# Patient Record
Sex: FEMALE | Race: WHITE | NOT HISPANIC OR LATINO | URBAN - METROPOLITAN AREA
[De-identification: names, ages, dates, MRNs, and addresses within clinical notes are randomized per-mention and may not be internally consistent; named-entity substitution may affect disease eponyms.]

---

## 2014-12-15 LAB — CRC RECOMMENDATION EXT: NORMAL

## 2023-04-19 LAB — BCS RECOMMENDATION EXT: NORMAL

## 2023-04-21 ENCOUNTER — DOCUMENTATION ONLY (OUTPATIENT)
Dept: FAMILY MEDICINE | Facility: CLINIC | Age: 73
End: 2023-04-21

## 2023-04-26 LAB
CHOLEST SERPL-MSCNC: 166 MG/DL (ref 0–200)
HDLC SERPL-MCNC: 64 MG/DL (ref 35–70)
LDLC SERPL CALC-MCNC: 79 MG/DL (ref 0–160)
TRIGL SERPL-MCNC: 116 MG/DL (ref 40–160)

## 2023-04-27 ENCOUNTER — DOCUMENTATION ONLY (OUTPATIENT)
Dept: FAMILY MEDICINE | Facility: CLINIC | Age: 73
End: 2023-04-27
Payer: MEDICARE

## 2023-04-28 ENCOUNTER — DOCUMENTATION ONLY (OUTPATIENT)
Dept: FAMILY MEDICINE | Facility: CLINIC | Age: 73
End: 2023-04-28
Payer: MEDICARE

## 2023-04-29 PROBLEM — E78.2 MIXED HYPERLIPIDEMIA: Status: ACTIVE | Noted: 2023-04-29

## 2023-04-29 PROBLEM — F41.9 ANXIETY AND DEPRESSION: Status: ACTIVE | Noted: 2023-04-29

## 2023-04-29 PROBLEM — N39.46 MIXED STRESS AND URGE URINARY INCONTINENCE: Status: ACTIVE | Noted: 2023-04-29

## 2023-04-29 PROBLEM — M85.80 OSTEOPENIA: Status: ACTIVE | Noted: 2023-04-29

## 2023-04-29 PROBLEM — F32.A ANXIETY AND DEPRESSION: Status: ACTIVE | Noted: 2023-04-29

## 2023-05-11 ENCOUNTER — PATIENT OUTREACH (OUTPATIENT)
Dept: ADMINISTRATIVE | Facility: HOSPITAL | Age: 73
End: 2023-05-11
Payer: MEDICARE

## 2023-05-11 ENCOUNTER — OFFICE VISIT (OUTPATIENT)
Dept: FAMILY MEDICINE | Facility: CLINIC | Age: 73
End: 2023-05-11
Payer: MEDICARE

## 2023-05-11 VITALS
TEMPERATURE: 99 F | SYSTOLIC BLOOD PRESSURE: 121 MMHG | RESPIRATION RATE: 18 BRPM | BODY MASS INDEX: 28.7 KG/M2 | OXYGEN SATURATION: 97 % | HEIGHT: 63 IN | DIASTOLIC BLOOD PRESSURE: 77 MMHG | HEART RATE: 89 BPM | WEIGHT: 162 LBS

## 2023-05-11 DIAGNOSIS — Z00.00 WELLNESS EXAMINATION: Primary | ICD-10-CM

## 2023-05-11 DIAGNOSIS — E78.2 MIXED HYPERLIPIDEMIA: ICD-10-CM

## 2023-05-11 DIAGNOSIS — N39.46 MIXED STRESS AND URGE URINARY INCONTINENCE: ICD-10-CM

## 2023-05-11 DIAGNOSIS — F32.A ANXIETY AND DEPRESSION: ICD-10-CM

## 2023-05-11 DIAGNOSIS — F41.9 ANXIETY AND DEPRESSION: ICD-10-CM

## 2023-05-11 DIAGNOSIS — M85.89 OSTEOPENIA OF MULTIPLE SITES: ICD-10-CM

## 2023-05-11 PROCEDURE — 1126F PR PAIN SEVERITY QUANTIFIED, NO PAIN PRESENT: ICD-10-PCS | Mod: CPTII,,, | Performed by: FAMILY MEDICINE

## 2023-05-11 PROCEDURE — 1100F PTFALLS ASSESS-DOCD GE2>/YR: CPT | Mod: CPTII,,, | Performed by: FAMILY MEDICINE

## 2023-05-11 PROCEDURE — 1160F PR REVIEW ALL MEDS BY PRESCRIBER/CLIN PHARMACIST DOCUMENTED: ICD-10-PCS | Mod: CPTII,,, | Performed by: FAMILY MEDICINE

## 2023-05-11 PROCEDURE — 1100F PR PT FALLS ASSESS DOC 2+ FALLS/FALL W/INJURY/YR: ICD-10-PCS | Mod: CPTII,,, | Performed by: FAMILY MEDICINE

## 2023-05-11 PROCEDURE — 3288F FALL RISK ASSESSMENT DOCD: CPT | Mod: CPTII,,, | Performed by: FAMILY MEDICINE

## 2023-05-11 PROCEDURE — 1159F PR MEDICATION LIST DOCUMENTED IN MEDICAL RECORD: ICD-10-PCS | Mod: CPTII,,, | Performed by: FAMILY MEDICINE

## 2023-05-11 PROCEDURE — 3078F DIAST BP <80 MM HG: CPT | Mod: CPTII,,, | Performed by: FAMILY MEDICINE

## 2023-05-11 PROCEDURE — 3074F SYST BP LT 130 MM HG: CPT | Mod: CPTII,,, | Performed by: FAMILY MEDICINE

## 2023-05-11 PROCEDURE — 1160F RVW MEDS BY RX/DR IN RCRD: CPT | Mod: CPTII,,, | Performed by: FAMILY MEDICINE

## 2023-05-11 PROCEDURE — 3008F BODY MASS INDEX DOCD: CPT | Mod: CPTII,,, | Performed by: FAMILY MEDICINE

## 2023-05-11 PROCEDURE — G0439 PR MEDICARE ANNUAL WELLNESS SUBSEQUENT VISIT: ICD-10-PCS | Mod: ,,, | Performed by: FAMILY MEDICINE

## 2023-05-11 PROCEDURE — 3008F PR BODY MASS INDEX (BMI) DOCUMENTED: ICD-10-PCS | Mod: CPTII,,, | Performed by: FAMILY MEDICINE

## 2023-05-11 PROCEDURE — 3078F PR MOST RECENT DIASTOLIC BLOOD PRESSURE < 80 MM HG: ICD-10-PCS | Mod: CPTII,,, | Performed by: FAMILY MEDICINE

## 2023-05-11 PROCEDURE — 1126F AMNT PAIN NOTED NONE PRSNT: CPT | Mod: CPTII,,, | Performed by: FAMILY MEDICINE

## 2023-05-11 PROCEDURE — 3074F PR MOST RECENT SYSTOLIC BLOOD PRESSURE < 130 MM HG: ICD-10-PCS | Mod: CPTII,,, | Performed by: FAMILY MEDICINE

## 2023-05-11 PROCEDURE — G0439 PPPS, SUBSEQ VISIT: HCPCS | Mod: ,,, | Performed by: FAMILY MEDICINE

## 2023-05-11 PROCEDURE — 3288F PR FALLS RISK ASSESSMENT DOCUMENTED: ICD-10-PCS | Mod: CPTII,,, | Performed by: FAMILY MEDICINE

## 2023-05-11 PROCEDURE — 1159F MED LIST DOCD IN RCRD: CPT | Mod: CPTII,,, | Performed by: FAMILY MEDICINE

## 2023-05-11 RX ORDER — GLUCOSAM/CHONDRO/HERB 149/HYAL 750-100 MG
TABLET ORAL
COMMUNITY
Start: 2022-12-06

## 2023-05-11 RX ORDER — AMPICILLIN TRIHYDRATE 500 MG
1000 CAPSULE ORAL
COMMUNITY
Start: 2022-12-06 | End: 2023-05-11 | Stop reason: SDUPTHER

## 2023-05-11 RX ORDER — AMPICILLIN TRIHYDRATE 500 MG
1000 CAPSULE ORAL DAILY
Qty: 90 CAPSULE | Refills: 3 | Status: SHIPPED | OUTPATIENT
Start: 2023-05-11 | End: 2024-05-10

## 2023-05-11 RX ORDER — ASPIRIN 81 MG/1
81 TABLET ORAL
COMMUNITY
Start: 2022-12-06

## 2023-05-11 RX ORDER — DICLOFENAC SODIUM 1 MG/ML
1 SOLUTION/ DROPS OPHTHALMIC 4 TIMES DAILY
COMMUNITY
Start: 2022-11-23 | End: 2023-11-27

## 2023-05-11 RX ORDER — ROSUVASTATIN CALCIUM 40 MG/1
40 TABLET, COATED ORAL
COMMUNITY
Start: 2023-04-06

## 2023-05-11 RX ORDER — SERTRALINE HYDROCHLORIDE 50 MG/1
50 TABLET, FILM COATED ORAL
COMMUNITY
Start: 2023-04-20 | End: 2023-07-31

## 2023-05-11 RX ORDER — LATANOPROST 50 UG/ML
1 SOLUTION/ DROPS OPHTHALMIC NIGHTLY
COMMUNITY
Start: 2022-12-23

## 2023-05-11 NOTE — PROGRESS NOTES
Patient ID: [id number]     Chief Complaint: Wellness      HPI:     Katerina Alfaro is a 72 y.o. female here today for a Medicare Wellness.   1) Hyperlipidemia: Patient is taking medication at Night. Trying to follow modify diet. Increase activity. No side effects of medication noted.  2)  Depression/Anxiety: Patient has been doing well on medication. Trying to incorporate lifestyle changes including exercise.  No noticeable side effects of the medication.  3) Hypertension: Patient has been taking medicine daily. BP is normal at home. No symptoms associated with increased BP such as headache/ visual changes/ dizziness/ chest pain.     Cervical Cancer Screening  : Patient refused pelvic exam    Breast Cancer Screening - Mammogram 4/19/2023    Colon Cancer Screening - Colonoscopy completed by Dr. Gannon    Osteoporosis Screening -DEXA Orders    Eye Exam - Recommend annually.    Dental Exam - Recommend biannually  Vaccinations -   Immunization History   Administered Date(s) Administered    COVID-19, MRNA, LN-S, PF (Pfizer) (Gray Cap) 06/21/2022    COVID-19, MRNA, LN-S, PF (Pfizer) (Purple Cap) 02/02/2021, 02/23/2021, 11/24/2021    Influenza (FLUAD) - Quadrivalent - Adjuvanted - PF *Preferred* (65+) 10/17/2022    Influenza - High Dose - PF (65 years and older) 11/15/2016, 01/04/2018, 10/19/2018    Influenza - Quadrivalent - PF *Preferred* (6 months and older) 11/03/2010, 09/24/2011, 07/26/2013, 10/27/2015, 02/14/2019    Influenza A (H1N1) 2009 Monovalent - IM - PF 12/10/2009    Pneumococcal Conjugate - 13 Valent 11/15/2016    Pneumococcal Polysaccharide - 23 Valent 03/07/2018    Tdap 09/09/2022    Zoster Recombinant 10/28/2020, 12/31/2020        A separate E/M code has been provided to evaluate additional complaints that the patient would like addressed during the dedicated Medicare Wellness Exam.    Past Medical History:   Diagnosis Date    Anxiety and depression 4/29/2023    Mixed hyperlipidemia 4/29/2023    Mixed  "stress and urge urinary incontinence 4/29/2023    Osteopenia 4/29/2023        History reviewed. No pertinent surgical history.     Social History     Tobacco Use    Smoking status: Never     Passive exposure: Never    Smokeless tobacco: Never   Substance Use Topics    Alcohol use: Not Currently    Drug use: Never        Social History     Socioeconomic History    Marital status: Single    Number of children: 2        Current Outpatient Medications   Medication Instructions    aspirin (ECOTRIN) 81 mg, Oral    diclofenac (VOLTAREN) 0.1 % ophthalmic solution 1 drop, Both Eyes, 4 times daily    latanoprost 0.005 % ophthalmic solution 1 drop, Both Eyes, Nightly    omega 3-dha-epa-fish oil 1,000 mg (120 mg-180 mg) Cap Oral    rosuvastatin (CRESTOR) 40 mg, Oral    sertraline (ZOLOFT) 50 mg, Oral    VITAMIN D3 1,000 Units, Oral, Daily       Review of patient's allergies indicates:  No Known Allergies     Patient Care Team:  Alina Negron MD as PCP - General (Family Medicine)     Subjective:     Review of Systems    12 point review of systems conducted, negative except as stated in the history of present illness. See HPI for details.    Patient Reported Health Risk Assessments:       Objective:     Visit Vitals  /77 (BP Location: Left arm, Patient Position: Sitting, BP Method: Medium (Automatic))   Pulse 89   Temp 98.6 °F (37 °C) (Oral)   Resp 18   Ht 5' 3" (1.6 m)   Wt 73.5 kg (162 lb)   SpO2 97%   BMI 28.70 kg/m²       Physical Exam    General: Alert and oriented, No acute distress.  Head: Normocephalic, Atraumatic.  Eye: Pupils are equal, round and reactive to light, Extraocular movements are intact, Sclera non-icteric.  Ears/Nose/Throat: Normal, Mucosa moist,Clear.  Neck/Thyroid: Supple, Non-tender, No palpable thyromegaly or thyroid nodule, No lymphadenopathy,  Full range of motion.  Respiratory: Clear to auscultation bilaterally; No wheezes, rales or rhonchi, Non-labored respirations, Symmetrical chest wall " "expansion.  Cardiovascular: Regular rate and rhythm, S1/S2 normal, No murmurs, rubs or gallops.  Gastrointestinal: Soft, Non-tender, Non-distended, Normal bowel sounds, No palpable organomegaly.  Musculoskeletal: Normal range of motion.  Integumentary: Warm, Dry, Intact, No suspicious lesions or rashes.  Extremities: No clubbing, cyanosis or edema  Neurologic: No focal deficits, Cranial Nerves II-XII are grossly intact, Motor strength normal upper and lower extremities, Sensory exam intact.  Psychiatric: Normal interaction, Coherent speech, Euthymic mood, Appropriate affect       Assessment:       ICD-10-CM ICD-9-CM   1. Wellness examination  Z00.00 V70.0   2. Mixed hyperlipidemia  E78.2 272.2   3. Anxiety and depression  F41.9 300.00    F32.A 311   4. Osteopenia of multiple sites  M85.89 733.90   5. Mixed stress and urge urinary incontinence  N39.46 788.33        Plan:     1. Wellness examination  Wellness: Five Rules Reviewed: Water/Nutrition-Real Food, Limit Fast Food/ Exercise 20-30 minutes most days of the week/ Mental health- "Is your work a calling or paycheck" Define 'What is your purpose-what matters to you' Stress- Is an Individual Response- Therefore Can be Controlled by the Individual. Meditation/ Immunizations/Multivitamin use-Vitamin D3/ Screenings    - Hepatitis C antibody; Future    2. Mixed hyperlipidemia  Lab Results   Component Value Date    CHOL 166 04/26/2023    TRIG 116 04/26/2023    HDL 64 04/26/2023     Pathophysiology/treatment/dangers discussed. Patient to continue diet modification.   Total cholesterol 166 ( Goal less than 200) HDL 64 ( Goal high as possible) LDL 79 ( Goal less than 100) Triglycerides 116 ( Goal less than 150)    - CBC Auto Differential; Future  - Comprehensive Metabolic Panel; Future  - Lipid Panel; Future    3. Anxiety and depression  Depression/Anxiety: Patient is doing well on medication for depression/anxiety- continue to encourage lifestyle modifications including " 20-30 minutes exercise daily/ meditation/ empower self through compassion. Always encourage patient to use lowest dose of medicine possible.      4. Osteopenia of multiple sites  Check studies management based upon results.  Pathophysiology/treatment/dangers discussed.    - DXA Bone Density Axial Skeleton 1 or more sites; Future  - VITAMIN D3 25 mcg (1,000 unit) capsule; Take 1 capsule (1,000 Units total) by mouth once daily.  Dispense: 90 capsule; Refill: 3    5. Mixed stress and urge urinary incontinence  Patient is currently stable.  No acute modifications recommended.  Continue with current treatment.          Fall Risk + Home Safety + Living Situation + Whisper Test + Depression Screen + CAGE + Cognitive Impairment Screen + ADL Screen + Timed Get Up and Go + Nutrition Screen + PAQ Screen + Health Risk Assessment all reviewed.     No flowsheet data found.  Fall Risk Assessment - Outpatient 5/11/2023   Mobility Status Ambulatory   Number of falls 1 with injury   Identified as fall risk 1                             CVD Risk Factors - Reviewed  Obesity/Physical Activity - Normal BMI. Encouraged daily 30 minute physical activity x 5 days per week.    Opioid Screening: Patient medication list reviewed, patient is not taking prescription opioids. Patient is not using additional opioids than prescribed. Patient is at low risk of substance abuse based on this opioid use history.     Advance Directives:   Living Will: No        Oral Declaration: No    LaPOST: No    Do Not Resuscitate Status: No    Medical Power of : No      Decision Making:  Patient answered questions  Goals of Care: The patient endorses that what is most important right now is to focus on quality of life, even if it means sacrificing a little time    Accordingly, we have decided that the best plan to meet the patient's goals includes continuing with treatment  Advance Care Planning   Advanced Care Planning: I offered to discuss Advanced Care  Planning, which consists of how you would like to be cared for as you end the near of your natural life.  This includes how to pick a person who would make decisions for you if you were unable to make them for yourself, which is called a Medical Power of . These discussions include what kind of decisions you will make regarding life sustaining measures, such as ventilators and feeding tubes, when faced with a life limiting illness recorded on a living will that they will need to know. Spent 20 minutes with the patient/family on the importance of Advanced Care Planning.          The patient's Health Maintenance was reviewed and the following appears to be due at this time:   Health Maintenance Due   Topic Date Due    Hepatitis C Screening  Never done    DEXA Scan  Never done    Colorectal Cancer Screening  Never done    COVID-19 Vaccine (5 - Booster for Pfizer series) 08/16/2022         Follow up in about 6 months (around 11/11/2023). In addition to their scheduled follow up, the patient has also been instructed to follow up on as needed basis.     Future Appointments   Date Time Provider Department Center   11/27/2023 10:30 AM Alina Negron MD [facility] [place]        Provided patient with a 5-10 year written screening schedule and personal prevention plan. Recommendations were developed using the USPSTF age appropriate recommendations. Education, counseling, and referrals were provided as needed. After Visit Summary printed and given to patient which includes a list of additional screenings\tests needed.    Alina Negron MD

## 2023-05-18 ENCOUNTER — PATIENT OUTREACH (OUTPATIENT)
Dept: ADMINISTRATIVE | Facility: HOSPITAL | Age: 73
End: 2023-05-18
Payer: MEDICARE

## 2023-06-27 ENCOUNTER — DOCUMENTATION ONLY (OUTPATIENT)
Dept: FAMILY MEDICINE | Facility: CLINIC | Age: 73
End: 2023-06-27
Payer: MEDICARE

## 2023-07-30 DIAGNOSIS — F41.9 ANXIETY AND DEPRESSION: Primary | ICD-10-CM

## 2023-07-30 DIAGNOSIS — F32.A ANXIETY AND DEPRESSION: Primary | ICD-10-CM

## 2023-07-31 RX ORDER — SERTRALINE HYDROCHLORIDE 50 MG/1
50 TABLET, FILM COATED ORAL
Qty: 90 TABLET | Refills: 0 | Status: SHIPPED | OUTPATIENT
Start: 2023-07-31 | End: 2023-10-20

## 2023-10-20 DIAGNOSIS — F41.9 ANXIETY AND DEPRESSION: ICD-10-CM

## 2023-10-20 DIAGNOSIS — F32.A ANXIETY AND DEPRESSION: ICD-10-CM

## 2023-10-20 RX ORDER — SERTRALINE HYDROCHLORIDE 50 MG/1
50 TABLET, FILM COATED ORAL
Qty: 90 TABLET | Refills: 0 | Status: SHIPPED | OUTPATIENT
Start: 2023-10-20 | End: 2024-01-17

## 2023-11-16 ENCOUNTER — DOCUMENTATION ONLY (OUTPATIENT)
Dept: FAMILY MEDICINE | Facility: CLINIC | Age: 73
End: 2023-11-16
Payer: MEDICARE

## 2023-11-16 LAB
CHOLEST SERPL-MSCNC: 178 MG/DL (ref 0–200)
HCV AB SER-ACNC: NEGATIVE
HCV AB SER-ACNC: NEGATIVE
HDLC SERPL-MCNC: 56 MG/DL (ref 35–70)
LDLC SERPL CALC-MCNC: 94 MG/DL (ref 0–160)
TRIGL SERPL-MCNC: 142 MG/DL (ref 40–160)

## 2023-11-27 ENCOUNTER — OFFICE VISIT (OUTPATIENT)
Dept: FAMILY MEDICINE | Facility: CLINIC | Age: 73
End: 2023-11-27
Payer: MEDICARE

## 2023-11-27 VITALS
BODY MASS INDEX: 29.45 KG/M2 | TEMPERATURE: 98 F | SYSTOLIC BLOOD PRESSURE: 120 MMHG | WEIGHT: 166.19 LBS | DIASTOLIC BLOOD PRESSURE: 78 MMHG | HEART RATE: 94 BPM | OXYGEN SATURATION: 97 % | HEIGHT: 63 IN

## 2023-11-27 DIAGNOSIS — Z00.00 WELLNESS EXAMINATION: Primary | ICD-10-CM

## 2023-11-27 DIAGNOSIS — N39.46 MIXED STRESS AND URGE URINARY INCONTINENCE: ICD-10-CM

## 2023-11-27 DIAGNOSIS — F32.A ANXIETY AND DEPRESSION: ICD-10-CM

## 2023-11-27 DIAGNOSIS — Z23 NEED FOR INFLUENZA VACCINATION: ICD-10-CM

## 2023-11-27 DIAGNOSIS — M85.89 OSTEOPENIA OF MULTIPLE SITES: ICD-10-CM

## 2023-11-27 DIAGNOSIS — E78.2 MIXED HYPERLIPIDEMIA: ICD-10-CM

## 2023-11-27 DIAGNOSIS — F41.9 ANXIETY AND DEPRESSION: ICD-10-CM

## 2023-11-27 PROCEDURE — G0008 FLU VACCINE - QUADRIVALENT - ADJUVANTED: ICD-10-PCS | Mod: ,,, | Performed by: FAMILY MEDICINE

## 2023-11-27 PROCEDURE — 90694 VACC AIIV4 NO PRSRV 0.5ML IM: CPT | Mod: ,,, | Performed by: FAMILY MEDICINE

## 2023-11-27 PROCEDURE — 3288F PR FALLS RISK ASSESSMENT DOCUMENTED: ICD-10-PCS | Mod: CPTII,,, | Performed by: FAMILY MEDICINE

## 2023-11-27 PROCEDURE — 1101F PR PT FALLS ASSESS DOC 0-1 FALLS W/OUT INJ PAST YR: ICD-10-PCS | Mod: CPTII,,, | Performed by: FAMILY MEDICINE

## 2023-11-27 PROCEDURE — 3288F FALL RISK ASSESSMENT DOCD: CPT | Mod: CPTII,,, | Performed by: FAMILY MEDICINE

## 2023-11-27 PROCEDURE — G0439 PPPS, SUBSEQ VISIT: HCPCS | Mod: ,,, | Performed by: FAMILY MEDICINE

## 2023-11-27 PROCEDURE — 3078F PR MOST RECENT DIASTOLIC BLOOD PRESSURE < 80 MM HG: ICD-10-PCS | Mod: CPTII,,, | Performed by: FAMILY MEDICINE

## 2023-11-27 PROCEDURE — 3078F DIAST BP <80 MM HG: CPT | Mod: CPTII,,, | Performed by: FAMILY MEDICINE

## 2023-11-27 PROCEDURE — G0439 PR MEDICARE ANNUAL WELLNESS SUBSEQUENT VISIT: ICD-10-PCS | Mod: ,,, | Performed by: FAMILY MEDICINE

## 2023-11-27 PROCEDURE — G0008 ADMIN INFLUENZA VIRUS VAC: HCPCS | Mod: ,,, | Performed by: FAMILY MEDICINE

## 2023-11-27 PROCEDURE — 3074F SYST BP LT 130 MM HG: CPT | Mod: CPTII,,, | Performed by: FAMILY MEDICINE

## 2023-11-27 PROCEDURE — 90694 FLU VACCINE - QUADRIVALENT - ADJUVANTED: ICD-10-PCS | Mod: ,,, | Performed by: FAMILY MEDICINE

## 2023-11-27 PROCEDURE — 1160F RVW MEDS BY RX/DR IN RCRD: CPT | Mod: CPTII,,, | Performed by: FAMILY MEDICINE

## 2023-11-27 PROCEDURE — 1101F PT FALLS ASSESS-DOCD LE1/YR: CPT | Mod: CPTII,,, | Performed by: FAMILY MEDICINE

## 2023-11-27 PROCEDURE — 1159F PR MEDICATION LIST DOCUMENTED IN MEDICAL RECORD: ICD-10-PCS | Mod: CPTII,,, | Performed by: FAMILY MEDICINE

## 2023-11-27 PROCEDURE — 3074F PR MOST RECENT SYSTOLIC BLOOD PRESSURE < 130 MM HG: ICD-10-PCS | Mod: CPTII,,, | Performed by: FAMILY MEDICINE

## 2023-11-27 PROCEDURE — 1159F MED LIST DOCD IN RCRD: CPT | Mod: CPTII,,, | Performed by: FAMILY MEDICINE

## 2023-11-27 PROCEDURE — 1160F PR REVIEW ALL MEDS BY PRESCRIBER/CLIN PHARMACIST DOCUMENTED: ICD-10-PCS | Mod: CPTII,,, | Performed by: FAMILY MEDICINE

## 2023-11-27 RX ORDER — TRIAMCINOLONE ACETONIDE 1 MG/G
1 OINTMENT TOPICAL 2 TIMES DAILY
COMMUNITY
Start: 2023-07-10 | End: 2023-11-27

## 2023-11-27 RX ORDER — PREDNISONE 20 MG/1
20 TABLET ORAL
COMMUNITY
Start: 2023-07-10 | End: 2023-11-27

## 2023-11-27 RX ORDER — MULTIVITAMIN
1 TABLET ORAL DAILY
COMMUNITY

## 2023-11-27 RX ORDER — MOXIFLOXACIN 5 MG/ML
SOLUTION/ DROPS OPHTHALMIC
COMMUNITY
Start: 2023-07-10 | End: 2023-11-27

## 2023-11-27 NOTE — PROGRESS NOTES
Patient ID: [id number]     Chief Complaint: Medicare AWV        HPI:     Katerina Alfaro is a 73 y.o. female here today for a Medicare Wellness.  1)  Depression/Anxiety: Patient has been doing well on medication. No noticeable side effects of the medication.  2) Hyperlipidemia: Patient is taking medication at Night. Trying to follow modify diet. Increase activity. No side effects of medication noted.    Cervical Cancer Screening -  Pap refused  Breast Cancer Screening - Mammogram Up to date   Colon Cancer Screening - Colonoscopy  Up to date   Osteoporosis Screening - DEXA Up to date   Eye Exam - Recommend annually.  Dental Exam - Recommend biannually  Vaccinations -   Immunization History   Administered Date(s) Administered    COVID-19, MRNA, LN-S, PF (Pfizer) (Gray Cap) 06/21/2022    COVID-19, MRNA, LN-S, PF (Pfizer) (Purple Cap) 02/02/2021, 02/23/2021, 11/24/2021    Influenza 07/26/2013    Influenza (FLUAD) - Quadrivalent - Adjuvanted - PF *Preferred* (65+) 10/17/2022    Influenza - High Dose - PF (65 years and older) 11/15/2016, 01/04/2018, 10/19/2018    Influenza - Quadrivalent - PF *Preferred* (6 months and older) 11/03/2010, 09/24/2011, 07/26/2013, 10/27/2015, 02/14/2019    Influenza A (H1N1) 2009 Monovalent - IM - PF 12/10/2009    Pneumococcal Conjugate - 13 Valent 11/15/2016    Pneumococcal Polysaccharide - 23 Valent 03/07/2018    Tdap 09/09/2022    Zoster Recombinant 10/28/2020, 12/31/2020        A separate E/M code has been provided to evaluate additional complaints that the patient would like addressed during the dedicated Medicare Wellness Exam.    Past Medical History:   Diagnosis Date    Anxiety and depression 04/29/2023    Mixed hyperlipidemia 04/29/2023    Mixed stress and urge urinary incontinence 04/29/2023        Past Surgical History:   Procedure Laterality Date    COLONOSCOPY  12/15/2014    EXTRACTION OF CATARACT  2022 9/2022- L eye 10/2022 R eye        Social History     Tobacco Use     "Smoking status: Never     Passive exposure: Never    Smokeless tobacco: Never   Substance Use Topics    Alcohol use: Not Currently    Drug use: Never        Social History     Socioeconomic History    Marital status: Single    Number of children: 2        Current Outpatient Medications   Medication Instructions    aspirin (ECOTRIN) 81 mg, Oral    diclofenac (VOLTAREN) 0.1 % ophthalmic solution 1 drop, Both Eyes, 4 times daily    latanoprost 0.005 % ophthalmic solution 1 drop, Both Eyes, Nightly    moxifloxacin (VIGAMOX) 0.5 % ophthalmic solution     multivitamin (THERAGRAN) per tablet 1 tablet, Oral, Daily    omega 3-dha-epa-fish oil 1,000 mg (120 mg-180 mg) Cap Oral    predniSONE (DELTASONE) 20 mg, Oral    rosuvastatin (CRESTOR) 40 mg, Oral    sertraline (ZOLOFT) 50 mg, Oral    triamcinolone acetonide 0.1% (KENALOG) 0.1 % ointment 1 application , Topical (Top), 2 times daily    VITAMIN D3 1,000 Units, Oral, Daily       Review of patient's allergies indicates:  No Known Allergies     Patient Care Team:  Alina Negron MD as PCP - General (Family Medicine)  Javier Ch Jr., MD as Consulting Physician (Cardiology)  Shannon Castrejon MD as Consulting Physician (Ophthalmology)  Ashok Milan MD (Dermatology)     Subjective:     Review of Systems    12 point review of systems conducted, negative except as stated in the history of present illness. See HPI for details.    Patient Reported Health Risk Assessments:       Objective:     Visit Vitals  /78   Pulse 94   Temp 97.7 °F (36.5 °C)   Ht 5' 3" (1.6 m)   Wt 75.4 kg (166 lb 3.2 oz)   SpO2 97%   BMI 29.44 kg/m²       Physical Exam    General: Alert and oriented, No acute distress.  Head: Normocephalic, Atraumatic.  Eye: Pupils are equal, round and reactive to light, Extraocular movements are intact, Sclera non-icteric.  Ears/Nose/Throat: Normal, Mucosa moist,Clear.  Neck/Thyroid: Supple  Respiratory: Clear to auscultation bilaterally  Cardiovascular: " Regular rate and rhythm  Gastrointestinal: Soft, Non-tender, Non-distended, Normal bowel sounds, No palpable organomegaly.  Musculoskeletal: Normal range of motion.  Integumentary: Warm, Dry, Intact, No suspicious lesions or rashes.  Extremities: No clubbing, cyanosis or edema  Neurologic: No focal deficits, Cranial Nerves II-XII are grossly intact  Psychiatric: Normal interaction, Coherent speech, Euthymic mood, Appropriate affect       Assessment:       ICD-10-CM ICD-9-CM   1. Wellness examination  Z00.00 V70.0   2. Anxiety and depression  F41.9 300.00    F32.A 311   3. Mixed hyperlipidemia  E78.2 272.2   4. Mixed stress and urge urinary incontinence  N39.46 788.33   5. Osteopenia of multiple sites  M85.89 733.90        Plan:     1. Wellness examination  Patient presented to office today for their Medicare Annual Wellness Visit.    Education was provided on health nutrition, including a diet mark in fruits and vegetables, minimizing simple carbohydrates, salt, and saturated fats.  Encouraged regular cardiovascular exercise such as walking at lease 30 minutes daily, 5 times per week.  Emphasized preventive health measures and educated pt on fall prevention and community-based lifestule interventions to help reduce health risks and promote healthy living.     2. Anxiety and depression  Depression/Anxiety: Patient is doing well on medication for depression/anxiety-Zoloft 50 mg- continue to encourage lifestyle modifications including 20-30 minutes exercise daily/ meditation/ empower self through compassion. Always encourage patient to use lowest dose of medicine possible.    3. Mixed hyperlipidemia  Lab Results   Component Value Date    CHOL 178 11/16/2023    TRIG 142 11/16/2023    HDL 56 11/16/2023     Pathophysiology/treatment/dangers discussed. Patient to continue diet modification/.   Total cholesterol 178 ( Goal less than 200) HDL 56 ( Goal high as possible) LDL 94 ( Goal less than 100) Triglycerides 142 ( Goal  less than 150)   - CBC Auto Differential; Future  - Comprehensive Metabolic Panel; Future  - Lipid Panel; Future    4. Mixed stress and urge urinary incontinence  Patient is currently stable.  No acute modifications recommended.  Continue with current treatment.   - Urinalysis; Future  - Urinalysis    5. Osteopenia of multiple sites  Pathophysiology/Treatment/ Dangers Discussed.  Patient to continue with calcium plus vitamin-D/strength training exercises-rechecked DEXA scan in 1 year.    6. Need for influenza vaccination  Patient was given influenza vaccination.  Risks versus benefits were reviewed.  Patient feels benefits outweigh risk.  Patient to call office with any acute changes or concerns.   - Influenza (FLUAD) - Quadrivalent (Adjuvanted) *Preferred* (65+) (PF)    Fall Risk + Home Safety + Living Situation + Whisper Test + Depression Screen + CAGE + Cognitive Impairment Screen + ADL Screen + Timed Get Up and Go + Nutrition Screen + PAQ Screen + Health Risk Assessment all reviewed.          No data to display                  11/27/2023    10:30 AM 5/11/2023     9:30 AM   Fall Risk Assessment - Outpatient   Mobility Status Ambulatory Ambulatory   Number of falls 0 1 with injury   Identified as fall risk False True                             CVD Risk Factors - Reviewed  Obesity/Physical Activity - Normal BMI. Encouraged daily 30 minute physical activity x 5 days per week.    Opioid Screening: Patient medication list reviewed, patient is not taking prescription opioids. Patient is not using additional opioids than prescribed. Patient is at low risk of substance abuse based on this opioid use history.     Advance Directives:   Living Will: No        Oral Declaration: No    LaPOST: No    Do Not Resuscitate Status: No    Medical Power of : No      Decision Making:  Patient answered questions  Goals of Care: What is most important right now is to focus on quality of life, even if it means sacrificing a  little time. Accordingly, we have decided that the best plan to meet the patient's goals includes continuing with treatment.    Advance Care Planning   Advanced Care Planning: I offered to discuss Advanced Care Planning, which consists of how you would like to be cared for as you end the near of your natural life.  This includes how to pick a person who would make decisions for you if you were unable to make them for yourself, which is called a Medical Power of . These discussions include what kind of decisions you will make regarding life sustaining measures, such as ventilators and feeding tubes, when faced with a life limiting illness recorded on a living will that they will need to know.            The patient's Health Maintenance was reviewed and the following appears to be due at this time:   Health Maintenance Due   Topic Date Due    DEXA Scan  Never done    RSV Vaccine (Age 60+ and Pregnant patients) (1 - 1-dose 60+ series) Never done    Influenza Vaccine (1) 09/01/2023    COVID-19 Vaccine (5 - 2023-24 season) 09/01/2023         No follow-ups on file. In addition to their scheduled follow up, the patient has also been instructed to follow up on as needed basis.     No future appointments.     Provided patient with a 5-10 year written screening schedule and personal prevention plan. Recommendations were developed using the USPSTF age appropriate recommendations. Education, counseling, and referrals were provided as needed. After Visit Summary printed and given to patient which includes a list of additional screenings\tests needed.    Alina Negron MD

## 2024-01-17 DIAGNOSIS — F32.A ANXIETY AND DEPRESSION: ICD-10-CM

## 2024-01-17 DIAGNOSIS — F41.9 ANXIETY AND DEPRESSION: ICD-10-CM

## 2024-01-17 RX ORDER — SERTRALINE HYDROCHLORIDE 50 MG/1
50 TABLET, FILM COATED ORAL
Qty: 90 TABLET | Refills: 0 | Status: SHIPPED | OUTPATIENT
Start: 2024-01-17 | End: 2024-04-15

## 2024-04-14 DIAGNOSIS — F41.9 ANXIETY AND DEPRESSION: ICD-10-CM

## 2024-04-14 DIAGNOSIS — F32.A ANXIETY AND DEPRESSION: ICD-10-CM

## 2024-04-15 RX ORDER — SERTRALINE HYDROCHLORIDE 50 MG/1
50 TABLET, FILM COATED ORAL
Qty: 90 TABLET | Refills: 0 | Status: SHIPPED | OUTPATIENT
Start: 2024-04-15

## 2024-04-24 DIAGNOSIS — Z12.31 BREAST CANCER SCREENING BY MAMMOGRAM: Primary | ICD-10-CM

## 2024-05-02 LAB — BCS RECOMMENDATION EXT: NORMAL

## 2024-05-10 ENCOUNTER — DOCUMENTATION ONLY (OUTPATIENT)
Dept: FAMILY MEDICINE | Facility: CLINIC | Age: 74
End: 2024-05-10
Payer: MEDICARE

## 2024-05-17 NOTE — PROGRESS NOTES
Patient ID: [id number]     Chief Complaint: Hyperlipidemia    HPI:     Katerina Alfaro is a 73 y.o. female here today for follow up:   Is getting back into her regular exercise routine-did have some problems with sciatic pain-has improved with physical therapy and exercise.  Is noticing increased problems with her varicose veins-despite using compression stockings-continues to have significant pain specially with increased activity.  1) Hyperlipidemia: Patient is taking medication at Night. Trying to follow modify diet. Increase activity. No side effects of medication noted.  2)  Depression/Anxiety: Patient has been doing well on medication. Trying to incorporate lifestyle changes including exercise.     Past Medical History:   Diagnosis Date    Anxiety and depression 04/29/2023    Mixed hyperlipidemia 04/29/2023    Mixed stress and urge urinary incontinence 04/29/2023        Past Surgical History:   Procedure Laterality Date    COLONOSCOPY  12/15/2014    EXTRACTION OF CATARACT  2022 9/2022- L eye 10/2022 R eye        Social History     Tobacco Use    Smoking status: Never     Passive exposure: Never    Smokeless tobacco: Never   Substance Use Topics    Alcohol use: Not Currently    Drug use: Never        Social History     Socioeconomic History    Marital status: Single    Number of children: 2        Current Outpatient Medications   Medication Instructions    aspirin (ECOTRIN) 81 mg, Oral    calcium carbonate/vitamin D3 (VITAMIN D-3 ORAL) TAKE 1 CAPSULE BY MOUTH ONCE DAILY    latanoprost 0.005 % ophthalmic solution 1 drop, Both Eyes, Nightly    multivitamin (THERAGRAN) per tablet 1 tablet, Daily    omega 3-dha-epa-fish oil 1,000 mg (120 mg-180 mg) Cap Oral    rosuvastatin (CRESTOR) 40 mg, Oral    sertraline (ZOLOFT) 50 mg, Oral       Review of patient's allergies indicates:  No Known Allergies     Patient Care Team:  Alina Negron MD as PCP - General (Family Medicine)  Javier Ch Jr., MD as Consulting  "Physician (Cardiology)  Shannon Castrejon MD as Consulting Physician (Ophthalmology)  Ashok Milan MD (Dermatology)       Subjective:     Review of Systems    12 point review of systems conducted, negative except as stated in the history of present illness. See HPI for details.      Objective:     Visit Vitals  /75 (BP Location: Left arm, Patient Position: Sitting, BP Method: Small (Automatic))   Pulse 99   Resp 16   Ht 5' 3" (1.6 m)   Wt 72.9 kg (160 lb 12.8 oz)   SpO2 99%   BMI 28.48 kg/m²       Physical Exam    General: Alert and oriented, No acute distress.  Head: Normocephalic, Atraumatic.  Eye: Pupils are equal, round and reactive to light, Extraocular movements are intact, Sclera non-icteric.  Ears/Nose/Throat: Normal, Mucosa moist,Clear.  Neck/Thyroid: Supple, Non-tender  Respiratory: Clear to auscultation bilaterally  Cardiovascular: Regular rate and rhythm, S1/S2 normal  Gastrointestinal: Soft, Non-tender, Non-distended, Normal bowel sounds, No palpable organomegaly.  Musculoskeletal: Normal range of motion.  Integumentary: Warm, Dry-varicose veins bilaterally legs  Extremities: No clubbing, cyanosis or edema  Neurologic: No focal deficits, Cranial Nerves II-XII are grossly intact  Psychiatric: Normal interaction, Coherent speech       Assessment:       ICD-10-CM ICD-9-CM   1. Mixed hyperlipidemia  E78.2 272.2   2. Anxiety and depression  F41.9 300.00    F32.A 311   3. Mixed stress and urge urinary incontinence  N39.46 788.33   4. Blood glucose elevated  R73.9 790.29   5. Varicose veins of both lower extremities with pain  I83.813 454.8   6. Osteopenia of multiple sites  M85.89 733.90        Plan:      1. Mixed hyperlipidemia  Lab Results   Component Value Date    CHOL 187 05/22/2024    TRIG 147 05/22/2024    HDL 62 05/22/2024     Pathophysiology/treatment/dangers discussed. Patient to continue diet modification/Crestor 40 mg.   Total cholesterol 187 ( Goal less than 200) HDL 62 ( Goal high as " possible) LDL 96 ( Goal less than 100) Triglycerides 147 ( Goal less than 150)   - CBC Auto Differential; Future  - Comprehensive Metabolic Panel; Future  - Lipid Panel; Future  - TSH; Future    2. Anxiety and depression  Depression/Anxiety: Patient is doing well on medication for depression/anxiety-Zoloft 50 mg- continue to encourage lifestyle modifications including 20-30 minutes exercise daily.    3. Mixed stress and urge urinary incontinence  Patient is currently stable.  No acute modifications recommended.  Continue with current treatment.      4. Blood glucose elevated  Check studies management based upon results.  Pathophysiology/treatment/dangers discussed.   - Hemoglobin A1C; Future  - Urinalysis; Future    5. Varicose veins of both lower extremities with pain  Pathophysiology/Treatment/ Dangers Discussed.  Patient referred to vascular surgery.  - Ambulatory referral/consult to Vascular Surgery; Future    6. Osteopenia of multiple sites  Pathophysiology/Treatment/ Dangers Discussed.  Continue with diet and lifestyle modification .      Follow up in about 6 months (around 11/29/2024) for Medicare Wellness. In addition to their scheduled follow up, the patient has also been instructed to follow up on as needed basis.     Future Appointments   Date Time Provider Department Center   12/3/2024 11:15 AM Alina Negron MD [facility] [place] Indira Gautam, MD

## 2024-05-22 ENCOUNTER — DOCUMENTATION ONLY (OUTPATIENT)
Dept: FAMILY MEDICINE | Facility: CLINIC | Age: 74
End: 2024-05-22
Payer: MEDICARE

## 2024-05-22 LAB
ANION GAP SERPL CALC-SCNC: 8 MMOL/L (ref ?–30)
BUN SERPL-MCNC: 15 MG/DL
CHLORIDE SERPL-SCNC: 108 MMOL/L (ref 99–108)
CHOLEST SERPL-MSCNC: 187 MG/DL (ref 0–200)
CO2 SERPL-SCNC: 25 MMOL/L
COLOR UR: YELLOW
ERYTHROCYTE [DISTWIDTH] IN BLOOD BY AUTOMATED COUNT: 40.4 FL
GLUCOSE SERPL-MCNC: 111 MG/DL
HDLC SERPL-MCNC: 62 MG/DL (ref 35–70)
HEMATOCRIT: 45.1
HEMOGLOBIN: 15.1
LDLC SERPL CALC-MCNC: 96 MG/DL
MCH RBC QN AUTO: 30.4 PG
MCHC RBC AUTO-ENTMCNC: 33.5 G/DL
MCV RBC AUTO: 90.9 FL
POTASSIUM SERPL-SCNC: 4.2 MMOL/L (ref 3.4–5.3)
RBC # BLD AUTO: 4.96 10*6/UL
RDW-CV: 12.2
SODIUM BLD-SCNC: 141 MMOL/L (ref 137–147)
TRIGL SERPL-MCNC: 147 MG/DL (ref 40–160)
WBC: 5.19

## 2024-05-29 ENCOUNTER — OFFICE VISIT (OUTPATIENT)
Dept: FAMILY MEDICINE | Facility: CLINIC | Age: 74
End: 2024-05-29
Payer: MEDICARE

## 2024-05-29 ENCOUNTER — DOCUMENTATION ONLY (OUTPATIENT)
Dept: FAMILY MEDICINE | Facility: CLINIC | Age: 74
End: 2024-05-29

## 2024-05-29 VITALS
RESPIRATION RATE: 16 BRPM | BODY MASS INDEX: 28.49 KG/M2 | HEART RATE: 99 BPM | WEIGHT: 160.81 LBS | SYSTOLIC BLOOD PRESSURE: 117 MMHG | OXYGEN SATURATION: 99 % | HEIGHT: 63 IN | DIASTOLIC BLOOD PRESSURE: 75 MMHG

## 2024-05-29 DIAGNOSIS — N39.46 MIXED STRESS AND URGE URINARY INCONTINENCE: ICD-10-CM

## 2024-05-29 DIAGNOSIS — M85.89 OSTEOPENIA OF MULTIPLE SITES: ICD-10-CM

## 2024-05-29 DIAGNOSIS — I83.813 VARICOSE VEINS OF BOTH LOWER EXTREMITIES WITH PAIN: ICD-10-CM

## 2024-05-29 DIAGNOSIS — F41.9 ANXIETY AND DEPRESSION: ICD-10-CM

## 2024-05-29 DIAGNOSIS — E78.2 MIXED HYPERLIPIDEMIA: Primary | ICD-10-CM

## 2024-05-29 DIAGNOSIS — F32.A ANXIETY AND DEPRESSION: ICD-10-CM

## 2024-05-29 DIAGNOSIS — R73.9 BLOOD GLUCOSE ELEVATED: ICD-10-CM

## 2024-05-29 PROCEDURE — 3078F DIAST BP <80 MM HG: CPT | Mod: CPTII,,, | Performed by: FAMILY MEDICINE

## 2024-05-29 PROCEDURE — 99214 OFFICE O/P EST MOD 30 MIN: CPT | Mod: ,,, | Performed by: FAMILY MEDICINE

## 2024-05-29 PROCEDURE — 3008F BODY MASS INDEX DOCD: CPT | Mod: CPTII,,, | Performed by: FAMILY MEDICINE

## 2024-05-29 PROCEDURE — 1160F RVW MEDS BY RX/DR IN RCRD: CPT | Mod: CPTII,,, | Performed by: FAMILY MEDICINE

## 2024-05-29 PROCEDURE — 1126F AMNT PAIN NOTED NONE PRSNT: CPT | Mod: CPTII,,, | Performed by: FAMILY MEDICINE

## 2024-05-29 PROCEDURE — 3288F FALL RISK ASSESSMENT DOCD: CPT | Mod: CPTII,,, | Performed by: FAMILY MEDICINE

## 2024-05-29 PROCEDURE — 1101F PT FALLS ASSESS-DOCD LE1/YR: CPT | Mod: CPTII,,, | Performed by: FAMILY MEDICINE

## 2024-05-29 PROCEDURE — 3074F SYST BP LT 130 MM HG: CPT | Mod: CPTII,,, | Performed by: FAMILY MEDICINE

## 2024-05-29 PROCEDURE — 1159F MED LIST DOCD IN RCRD: CPT | Mod: CPTII,,, | Performed by: FAMILY MEDICINE

## 2024-05-29 NOTE — LETTER
AUTHORIZATION FOR RELEASE OF   CONFIDENTIAL INFORMATION    Dear [facility],    We are seeing Katerina Alfaro, date of birth 1950, in the clinic at [facility]. Alina Negron MD is the patient's PCP. Katerina Alfaro has an outstanding lab/procedure at the time we reviewed her chart. In order to help keep her health information updated, she has authorized us to request the following medical record(s):        (  )  MAMMOGRAM                                      (  )  COLONOSCOPY      (  )  PAP SMEAR                                          (  )  OUTSIDE LAB RESULTS     ( X )  DEXA SCAN                                          (  )  EYE EXAM            (  )  FOOT EXAM                                          (  )  ENTIRE RECORD     (  )  OUTSIDE IMMUNIZATIONS                 (  )  _______________         Please fax records to [facility], Gautam, Indira, MD, [phone]     If you have any questions, please contact [place] at [phone].           Patient Name: Katerina Alfaro  : 1950  Patient Phone #: [phone]

## 2024-06-19 NOTE — PROGRESS NOTES
"    [facility] - Clinic Note  Rufino Collins MD      Patient Name: Katerina Alfaro                   : 1950      MRN: [id number]   Visit Date: 2024       History Present Illness     Reason for Visit: Varicose Veins    Ms. Alfaro presents to the clinic for evaluation of her varicose vein. She reports continued discomfort despite using compression stockings stating she has significant pain specially with increased activity. She states this is mostly behind her left knee. She has a family history of varicose veins (her mother). She denies leg swelling or history of deep vein thrombosis. She has had 2 pregnancies. She is a nonsmoker.  She states she does wear knee high compression stockings.     She is with cardiologist, Dr. Ch. She states she had a recent workup and states her heart is healthy. She is getting an aorta duplex due to family history of aneurysm (her father).      REVIEW OF SYSTEMS  12 point review of systems conducted, negative except as stated in the history of present illness. See HPI for details.        Physical Exam      Vitals:    24 0816 24 0817   BP: 123/85 120/79   BP Location: Right arm Left arm   Pulse: 71 (P) 76   Weight: 72.6 kg (160 lb)    Height: 5' 3" (1.6 m)           General: well-nourished, no acute distress, and healthy appearing, ambulating normally, alert, pleasant, conversant, and oriented  Neurologic: cranial nerves are grossly intact, no neurologic deficits, no motor deficits, and no sensory deficits  HEENT: grossly normal and no scleral icterus  Neck/Chest: normal , soft without lymphadenopathy, and palpable carotid pulses bilaterally  Respiratory: breathing easily and without respiratory distress  Abdomen: normal, soft, and no palpable masses  Cardiology: regular rate and rhythm    Upper Extremity Arterial Exam:   Right - radial is palpable  Left - radial is palpable    Lower Extremity Arterial Exam:  Right - dorsalis pedis is " palpable  Left - posterior tibial is palpable and dorsalis pedis is palpable    Musculoskeletal:   Upper Extremity: normal bilateral hand function  Lower Extremity: no edema present to bilateral lower extremities     Skin: has no obvious skin abnormality to lower extremities    Venous Exam:  Right Lower Extremity - spider veins to thighs, medial posterior aspect of her lower leg (focal and small)  Left Lower Extremity - spider veins to thighs, varicose veins to popliteal fossa    Measurements: 8 inches (ankle), 14 inches (calf), 21.5 inches (thigh)          Assessment and Plan     Ms. Alfaro is a 73 y.o. woman with asymptomatic small, varicose veins to both legs with multiple spider veins to her legs.  She has had some recently left knee pain that has since improved. There is no significant vascular disease that correlates to her focal knee pain. She has no clear stigmata of significant venous insufficiency. She has easily palpable pedal pulses suggestive of a healthy arterial system. Discussed obtaining  lower extremity venous duplex for evaluation of venous insufficiency though I suspect this would be low-yield. Measured and educated on use of compression therapy. If her symptoms worsen or becomes persistent, recommend further investigation.          1. Varicose veins of both lower extremities with pain  - Ambulatory referral/consult to Vascular Surgery          Imaging Obtained/Reviewed           Medical History     Past Medical History:   Diagnosis Date    Anxiety and depression 04/29/2023    Mixed hyperlipidemia 04/29/2023    Mixed stress and urge urinary incontinence 04/29/2023     Past Surgical History:   Procedure Laterality Date    COLONOSCOPY  12/15/2014    EXTRACTION OF CATARACT  2022 9/2022- L eye 10/2022 R eye     Family History   Problem Relation Name Age of Onset    Pneumonia Mother  88    Heart attack Father  86    Heart attack Brother  55     Social History     Socioeconomic History    Marital  status: Single    Number of children: 2   Occupational History    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[employer]   Tobacco Use    Smoking status: Former     Types: Cigarettes     Passive exposure: Never    Smokeless tobacco: Never   Substance and Sexual Activity    Alcohol use: Not Currently    Drug use: Never    Sexual activity: Not Currently     Partners: Male     Social Determinants of Health     Financial Resource Strain: Low Risk  (5/29/2024)    Overall Financial Resource Strain (CARDIA)     Difficulty of Paying Living Expenses: Not hard at all   Food Insecurity: No Food Insecurity (5/29/2024)    Hunger Vital Sign     Worried About Running Out of Food in the Last Year: Never true     Ran Out of Food in the Last Year: Never true   Transportation Needs: No Transportation Needs (5/29/2024)    TRANSPORTATION NEEDS     Transportation : No   Physical Activity: Sufficiently Active (5/29/2024)    Exercise Vital Sign     Days of Exercise per Week: 6 days     Minutes of Exercise per Session: 50 min   Stress: No Stress Concern Present (5/29/2024)    [place] [employer] - Occupational Stress Questionnaire     Feeling of Stress : Not at all   Housing Stability: Low Risk  (5/29/2024)    Housing Stability Vital Sign     Unable to Pay for Housing in the Last Year: No     Homeless in the Last Year: No     Current Outpatient Medications   Medication Instructions    aspirin (ECOTRIN) 81 mg, Oral    calcium carbonate/vitamin D3 (VITAMIN D-3 ORAL) TAKE 1 CAPSULE BY MOUTH ONCE DAILY    latanoprost 0.005 % ophthalmic solution 1 drop, Both Eyes, Nightly    omega 3-dha-epa-fish oil 1,000 mg (120 mg-180 mg) Cap Oral    rosuvastatin (CRESTOR) 40 mg, Oral    sertraline (ZOLOFT) 50 mg, Oral     Review of patient's allergies indicates:  No Known Allergies    Patient Care Team:  Alina Negron MD as PCP - General (Family Medicine)  Javier Ch Jr., MD as Consulting Physician (Cardiology)  Shannon Castrejon  MD FABRICE as Consulting Physician (Ophthalmology)  Ashok Milan MD (Dermatology)        No follow-ups on file. In addition to their scheduled follow up, the patient has also been instructed to follow up on as needed basis.     Future Appointments   Date Time Provider Department Center   12/3/2024 11:15 AM Alina Negron MD [facility]

## 2024-07-02 ENCOUNTER — OFFICE VISIT (OUTPATIENT)
Dept: VASCULAR SURGERY | Facility: CLINIC | Age: 74
End: 2024-07-02
Payer: MEDICARE

## 2024-07-02 VITALS
HEIGHT: 63 IN | SYSTOLIC BLOOD PRESSURE: 120 MMHG | DIASTOLIC BLOOD PRESSURE: 79 MMHG | WEIGHT: 160 LBS | HEART RATE: 71 BPM | BODY MASS INDEX: 28.35 KG/M2

## 2024-07-02 DIAGNOSIS — I83.813 VARICOSE VEINS OF BOTH LOWER EXTREMITIES WITH PAIN: ICD-10-CM

## 2024-07-02 PROCEDURE — 99203 OFFICE O/P NEW LOW 30 MIN: CPT | Mod: ,,, | Performed by: SPECIALIST

## 2024-07-02 PROCEDURE — 3008F BODY MASS INDEX DOCD: CPT | Mod: CPTII,,, | Performed by: SPECIALIST

## 2024-07-02 PROCEDURE — 1159F MED LIST DOCD IN RCRD: CPT | Mod: CPTII,,, | Performed by: SPECIALIST

## 2024-07-02 PROCEDURE — 1101F PT FALLS ASSESS-DOCD LE1/YR: CPT | Mod: CPTII,,, | Performed by: SPECIALIST

## 2024-07-02 PROCEDURE — 3078F DIAST BP <80 MM HG: CPT | Mod: CPTII,,, | Performed by: SPECIALIST

## 2024-07-02 PROCEDURE — 3074F SYST BP LT 130 MM HG: CPT | Mod: CPTII,,, | Performed by: SPECIALIST

## 2024-07-02 PROCEDURE — 3288F FALL RISK ASSESSMENT DOCD: CPT | Mod: CPTII,,, | Performed by: SPECIALIST

## 2024-07-02 PROCEDURE — 1160F RVW MEDS BY RX/DR IN RCRD: CPT | Mod: CPTII,,, | Performed by: SPECIALIST

## 2024-07-09 DIAGNOSIS — F32.A ANXIETY AND DEPRESSION: ICD-10-CM

## 2024-07-09 DIAGNOSIS — F41.9 ANXIETY AND DEPRESSION: ICD-10-CM

## 2024-07-09 RX ORDER — SERTRALINE HYDROCHLORIDE 50 MG/1
50 TABLET, FILM COATED ORAL
Qty: 90 TABLET | Refills: 0 | Status: SHIPPED | OUTPATIENT
Start: 2024-07-09

## 2024-08-13 DIAGNOSIS — E55.9 VITAMIN D DEFICIENCY: Primary | ICD-10-CM

## 2024-08-13 RX ORDER — VIT C/E/ZN/COPPR/LUTEIN/ZEAXAN 250MG-90MG
CAPSULE ORAL
Qty: 90 CAPSULE | Refills: 0 | Status: SHIPPED | OUTPATIENT
Start: 2024-08-13

## 2024-09-20 ENCOUNTER — DOCUMENTATION ONLY (OUTPATIENT)
Dept: FAMILY MEDICINE | Facility: CLINIC | Age: 74
End: 2024-09-20
Payer: MEDICARE

## 2024-09-20 DIAGNOSIS — R19.5 POSITIVE COLORECTAL CANCER SCREENING USING COLOGUARD TEST: Primary | ICD-10-CM

## 2024-09-20 LAB — NONINV COLON CA DNA+OCC BLD SCRN STL QL: POSITIVE

## 2024-10-02 DIAGNOSIS — F41.9 ANXIETY AND DEPRESSION: ICD-10-CM

## 2024-10-02 DIAGNOSIS — F32.A ANXIETY AND DEPRESSION: ICD-10-CM

## 2024-10-02 RX ORDER — SERTRALINE HYDROCHLORIDE 50 MG/1
50 TABLET, FILM COATED ORAL
Qty: 90 TABLET | Refills: 0 | Status: SHIPPED | OUTPATIENT
Start: 2024-10-02

## 2024-11-07 LAB — CRC RECOMMENDATION EXT: NORMAL

## 2024-11-15 ENCOUNTER — DOCUMENTATION ONLY (OUTPATIENT)
Dept: FAMILY MEDICINE | Facility: CLINIC | Age: 74
End: 2024-11-15
Payer: MEDICARE

## 2024-11-28 NOTE — PROGRESS NOTES
Patient ID: [id number]     Chief Complaint: Medicare AWV        HPI:     Katerina Alfaro is a 74 y.o. female here today for a Medicare Wellness.   Is continuing is continuing to make diet and lifestyle modifications in order to increase quality of life.  Overall doing well.  Does have an area on her left ear-that feels like a pimple-not associated with the pain or itching-is monitored by a dermatologist regularly.   1) Hyperlipidemia: Patient is taking Crestor 40 mg at night. No side effects of medication noted.  2)  Depression/Anxiety: Patient has been doing well on medication-Zoloft 50 mg.  No side effects related to the medication.    Cervical Cancer Screening -  Pap guidelines discussed with the patient  Breast Cancer Screening - Mammogram Up to date   Colon Cancer Screening - Colonoscopy Up to date   Osteoporosis Screening - DEXA  completed last year  Eye Exam - Recommend annually.  Dental Exam - Recommend biannually  Vaccinations -   Immunization History   Administered Date(s) Administered    COVID-19, MRNA, LN-S, PF (Pfizer) (Gray Cap) 06/21/2022    COVID-19, MRNA, LN-S, PF (Pfizer) (Purple Cap) 02/02/2021, 02/23/2021, 11/24/2021    Influenza 07/26/2013    Influenza (FLUAD) - Quadrivalent - Adjuvanted - PF *Preferred* (65+) 10/17/2022, 11/27/2023    Influenza - Quadrivalent - PF *Preferred* (6 months and older) 11/03/2010, 09/24/2011, 07/26/2013, 10/27/2015, 02/14/2019    Influenza - Trivalent - Afluria, Fluzone MDV 11/03/2010, 02/14/2019    Influenza - Trivalent - Fluad - Adjuvanted - PF (65 years and older 12/03/2024    Influenza - Trivalent - Fluarix, Flulaval, Fluzone, Afluria - PF 09/24/2011, 07/26/2013    Influenza - Trivalent - Fluzone High Dose - PF (65 years and older) 11/15/2016, 01/04/2018, 10/19/2018, 10/20/2021    Influenza A (H1N1) 2009 Monovalent - IM - PF 12/10/2009    Pneumococcal Conjugate - 13 Valent 11/15/2016    Pneumococcal Polysaccharide - 23 Valent 03/07/2018    Tdap 09/09/2022     Zoster Recombinant 10/28/2020, 12/31/2020        Past Medical History:   Diagnosis Date    Anxiety and depression 04/29/2023    Mixed hyperlipidemia 04/29/2023    Mixed stress and urge urinary incontinence 04/29/2023        Past Surgical History:   Procedure Laterality Date    COLONOSCOPY  12/15/2014    EXTRACTION OF CATARACT  2022 9/2022- L eye 10/2022 R eye        Social History     Tobacco Use    Smoking status: Former     Types: Cigarettes     Passive exposure: Never    Smokeless tobacco: Never   Substance Use Topics    Alcohol use: Not Currently    Drug use: Never        Social History     Socioeconomic History    Marital status: Single    Number of children: 2        Current Outpatient Medications   Medication Instructions    aspirin (ECOTRIN) 81 mg    cholecalciferol, vitamin D3, (VITAMIN D3) 25 mcg (1,000 unit) capsule Take 1 capsule by mouth once daily    latanoprost 0.005 % ophthalmic solution 1 drop, Nightly    rosuvastatin (CRESTOR) 40 mg    sertraline (ZOLOFT) 50 mg, Oral       Review of patient's allergies indicates:  No Known Allergies     Patient Care Team:  Alina Negron MD as PCP - General (Family Medicine)  Javier Ch Jr., MD as Consulting Physician (Cardiology)  Shannon Castrejon MD as Consulting Physician (Ophthalmology)  Ashok Milan MD (Dermatology)     Subjective:     Review of Systems    12 point review of systems conducted, negative except as stated in the history of present illness. See HPI for details.    Patient Reported Health Risk Assessments:  What [age] 70-79  Are you male or female?: Female  During the past four weeks, how much have you been bothered by emotional problems such as feeling anxious, depressed, irritable, sad, or downhearted and blue?: Not at all  During the past five weeks, has your physical and/or emotional health limited your social activities with family, friends, neighbors, or groups?: Not at all  During the past four weeks, how much bodily  pain have you generally had?: No pain  During the past four weeks, was someone available to help if you needed and wanted help?: No, not at all  During the past four weeks, what was the hardest physical activity you could do for at least two minutes?: Moderate  Can you get to places out of walking distance without help?  (For example, can you travel alone on buses or taxis, or drive your own car?): Yes  Can you go shopping for groceries or clothes without someone's help?: Yes  Can you prepare your own meals?: Yes  Can you do your own housework without help?: Yes  Because of any health problems, do you need the help of another person with your personal care needs such as eating, bathing, dressing, or getting around the house?: No  Can you handle your own money without help?: Yes  During the past four weeks, how would you rate your health in general?: Good  How have things been going for you during the past four weeks?: Pretty well  Are you having difficulties driving your car?: No  Do you always fasten your seat belt when you are in a car?: Yes, usually  How often in the past four weeks have you been bothered by falling or dizzy when standing up?: Never  How often in the past four weeks have you been bothered by sexual problems?: Never  How often in the past four weeks have you been bothered by trouble eating well?: Never  How often in the past four weeks have you been bothered by teeth or denture problems?: Never  How often in the past four weeks have you been bothered with problems using the telephone?: Never  How often in the past four weeks have you been bothered by tiredness or fatigue?: Never  Have you fallen two or more times in the past year?: No  Are you afraid of falling?: Yes  Are you a smoker?: No  During the past four weeks, how many drinks of wine, beer, or other alcoholic beverages did you have?: No alcohol at all  Do you exercise for about 20 minutes three or more days a week?: Yes, some of the  "time  Have you been given any information to help you with hazards in your house that might hurt you?: Yes  Have you been given any information to help you with keeping track of your medications?: Yes  How often do you have trouble taking medicines the way you've been told to take them?: I always take them as prescribed  How confident are you that you can control and manage most of your health problems?: Very confident  What is your race? (Check all that apply.):     Objective:     Visit Vitals  /83 (BP Location: Right arm, Patient Position: Sitting)   Pulse 103   Resp 18   Ht 5' 3" (1.6 m)   Wt 76.6 kg (168 lb 12.8 oz)   SpO2 98%   BMI 29.90 kg/m²       Physical Exam    General: Alert and oriented, No acute distress.  Head: Normocephalic, Atraumatic.  Eye: Pupils are equal, round and reactive to light, Extraocular movements are intact, Sclera non-icteric.  Ears/Nose/Throat: Normal, Mucosa moist,Clear.  Neck/Thyroid: Supple, Non-tender, No palpable thyromegaly or thyroid nodule, No lymphadenopathy, No JVD, Full range of motion.  Respiratory: Clear to auscultation bilaterally; No wheezes, rales or rhonchi, Non-labored respirations, Symmetrical chest wall expansion.  Cardiovascular: Regular rate and rhythm, S1/S2 normal, No murmurs, rubs or gallops.  Gastrointestinal: Soft, Non-tender, Non-distended, Normal bowel sounds, No palpable organomegaly.  Musculoskeletal: Normal range of motion.  Integumentary: Warm, Dry, Intact, unable to appreciate swelling or lesion on the ear  Extremities: No clubbing, cyanosis or edema  Neurologic: No focal deficits, Cranial Nerves II-XII are grossly intact, Motor strength normal upper and lower extremities, Sensory exam intact.  Psychiatric: Normal interaction, Coherent speech, Euthymic mood, Appropriate affect       Assessment:       ICD-10-CM ICD-9-CM   1. Wellness examination  Z00.00 V70.0   2. Mixed hyperlipidemia  E78.2 272.2   3. Anxiety and depression  F41.9 300.00 "    F32.A 311   4. Mixed stress and urge urinary incontinence  N39.46 788.33   5. Osteopenia of multiple sites  M85.89 733.90   6. Abnormal urine  R82.90 791.9   7. Flu vaccine need  Z23 V04.81        Plan:     1. Wellness examination (Primary)  Patient presented to office today for their Medicare Annual Wellness Visit.    Education was provided on health nutrition, including a diet rich in fruits and vegetables, minimizing simple carbohydrates, salt, and saturated fats.  Encouraged regular cardiovascular exercise such as walking at lease 30 minutes daily, 5 times per week.  Emphasized preventive health measures and educated pt on fall prevention and community-based lifestule interventions to help reduce health risks and promote healthy living.     2. Mixed hyperlipidemia  Lab Results   Component Value Date    CHOL 196 12/02/2024    TRIG 196 (A) 12/02/2024    HDL 75 (A) 12/02/2024     Pathophysiology/treatment/dangers discussed. Patient to continue diet modification-Crestor 40 mg-Co Q 20 mg.   Total cholesterol 196 ( Goal less than 200) HDL 75 ( Goal high as possible) LDL 82 ( Goal less than 100) Triglycerides 196 ( Goal less than 150)   - CBC Auto Differential; Future  - Comprehensive Metabolic Panel; Future  - Lipid Panel; Future    3. Anxiety and depression  Patient is doing well on Zoloft - continue to encourage lifestyle modifications including 20-30 minutes exercise daily/ meditation/ empower self through compassion. Always encourage patient to use lowest dose of medicine possible.    4. Mixed stress and urge urinary incontinence  Patient is currently stable.  No acute modifications recommended.  Continue with lifestyle modifications.    5. Osteopenia of multiple sites  Pathophysiology/Treatment/ Dangers Discussed.  Patient is continuing with calcium plus vitamin-D supplementation    6. Abnormal urine  Check studies management based upon results.  Pathophysiology/treatment/dangers discussed.    - Urinalysis,  Reflex to Urine Culture; Future    7. Flu vaccine need  Patient was given influenza vaccination.  Risks versus benefits were reviewed.  Patient feels benefits outweigh risk.  Patient to call office with any acute changes or concerns.    - influenza (adjuvanted) (Fluad) 45 mcg/0.5 mL IM vaccine (> or = 66 yo) 0.5 mL      Fall Risk + Home Safety + Living Situation + Whisper Test + Depression Screen + CAGE + Cognitive Impairment Screen + ADL Screen + Timed Get Up and Go + Nutrition Screen + PAQ Screen + Health Risk Assessment all reviewed.         12/3/2024    11:45 AM   Checklist of Activities of Daily Living   Bathing Independent   Dressing Independent   Grooming Independent   Oral Care Independent   Toileting Independent   Transferring Independent   Walking Independent   Climbing Stairs Independent   Eating Independent   Shopping Independent   Cooking Independent   Managing Medications Independent   Using the Phone Independent   Housework Indpendent   Laundry Independent   Driving Independent   Managing Finances Independent         12/3/2024    11:15 AM 7/2/2024     8:20 AM 5/29/2024    10:00 AM 11/27/2023    10:30 AM 5/11/2023     9:30 AM   Fall Risk Assessment - Outpatient   Mobility Status Ambulatory Ambulatory Ambulatory Ambulatory Ambulatory   Number of falls 0 0 0 0 1 with injury   Identified as fall risk False False False False True                          Offer of free  was accepted or rejected?: rejected  If  rejected, why?: Patient states understands English    CVD Risk Factors - Reviewed  Obesity/Physical Activity - Normal BMI. Encouraged daily 30 minute physical activity x 5 days per week.    Opioid Screening: Patient medication list reviewed, patient is not taking prescription opioids. Patient is not using additional opioids than prescribed. Patient is at low risk of substance abuse based on this opioid use history.     Advance Care Planning     Date:  11/27/2024  Advanced Care Planning: I offered to discuss Advanced Care Planning, which consists of how you would like to be cared for as you end the near of your natural life.  This includes how to pick a person who would make decisions for you if you were unable to make them for yourself, which is called a Medical Power of . These discussions include what kind of decisions you will make regarding life sustaining measures, such as ventilators and feeding tubes, when faced with a life limiting illness recorded on a living will that they will need to know.               The patient's Health Maintenance was reviewed and the following appears to be due at this time:   There are no preventive care reminders to display for this patient.        Follow up in about 6 months (around 6/3/2025). In addition to their scheduled follow up, the patient has also been instructed to follow up on as needed basis.     Future Appointments   Date Time Provider Department Center   6/12/2025  8:30 AM Alina Negron MD [facility]        Provided patient with a 5-10 year written screening schedule and personal prevention plan. Recommendations were developed using the USPSTF age appropriate recommendations. Education, counseling, and referrals were provided as needed. After Visit Summary printed and given to patient which includes a list of additional screenings\tests needed.    Alina Negron MD

## 2024-12-02 ENCOUNTER — DOCUMENTATION ONLY (OUTPATIENT)
Dept: FAMILY MEDICINE | Facility: CLINIC | Age: 74
End: 2024-12-02
Payer: MEDICARE

## 2024-12-02 LAB
ALBUMIN SERPL-MCNC: 4 G/DL
ALP ISOS SERPL LEV INH-CCNC: 73 U/L
ALT: 40
ANION GAP: 10
AST: 25
BACTERIA #/AREA URNS HPF: ABNORMAL /HPF
BASOPHILS # BLD AUTO: 0.04 K/UL
BASOPHILS NFR BLD: 1 %
BILIRUB UR QL STRIP: NEGATIVE
BILIRUBIN, TOTAL: 0.4
BUN SERPL-MCNC: 12 MG/DL
CA-I BLD-SCNC: 9.86 MMOL/L
CHLORIDE: 108 MMOL/L
CHOLEST SERPL-MSCNC: 196 MG/DL (ref 0–200)
CLARITY UR: CLEAR
CO2 SERPL-SCNC: 25 MMOL/L
COLOR UR: YELLOW
CREAT SERPL-MCNC: 0.8 MG/DL
EOSINOPHIL NFR BLD: 4 %
EOSINOPHILS - ABSOLUTE #: 0 /ΜL
ERYTHROCYTE [DISTWIDTH] IN BLOOD BY AUTOMATED COUNT: 41.2 FL
GLUCOSE SERPL-MCNC: 102 MG/DL
GLUCOSE UR QL: NEGATIVE
HBA1C MFR BLD: 5.7 %
HCT VFR BLD AUTO: 47.3 %
HDLC SERPL-MCNC: 75 MG/DL (ref 35–70)
HEMOGLOBIN: 15.9
IMM GRANULOCYTES # BLD AUTO: 0.01 K/UL (ref 0–0.04)
IMM GRANULOCYTES NFR BLD AUTO: 0.2 %
KETONES UR QL STRIP: NEGATIVE
LDLC SERPL CALC-MCNC: 82 MG/DL
LEUKOCYTE ESTERASE UR QL STRIP: NEGATIVE
LYMPH #: 1.75 K/UL
LYMPH %: 32.2
MCH RBC QN AUTO: 30.3 PG
MCHC RBC AUTO-ENTMCNC: 33.6 G/DL
MCV RBC AUTO: 90.3 FL
MONOCYTES ABSOLUTE COUNT: 0.31
MONOCYTES NFR BLD AUTO: 5.7 %
NEUTROPHILS ABSOLUTE: 3.09
NEUTROPHILS NFR BLD: 57 %
NITRATES, URINE (TOX): NEGATIVE
NRBC # BLD AUTO: 0 X10E3/UL
NRBC %: 0
PH UR STRIP: 7 PH UNITS
PLATELET # BLD AUTO: 221 K/UL (ref 150–399)
POTASSIUM SERPL-SCNC: 3.9 MMOL/L
PROT SERPL-MCNC: 7.4 G/DL
PROT UR QL STRIP: NEGATIVE
RBC # BLD AUTO: 5.24 10*6/UL
RBC # FLD AUTO: POSITIVE 10*3/UL
RBC # UR STRIP: ABNORMAL /UL
RDW-CV: 12.4
SODIUM SERPL-SCNC: 143 MMOL/L
SP GR UR STRIP: 1.01
TRIGL SERPL-MCNC: 196 MG/DL (ref 40–160)
TSH: 2.32
UROBILINOGEN UA: 0.2
WBC # UR STRIP: ABNORMAL /UL
WBC: 5.43

## 2024-12-03 ENCOUNTER — OFFICE VISIT (OUTPATIENT)
Dept: FAMILY MEDICINE | Facility: CLINIC | Age: 74
End: 2024-12-03
Payer: MEDICARE

## 2024-12-03 ENCOUNTER — DOCUMENTATION ONLY (OUTPATIENT)
Dept: FAMILY MEDICINE | Facility: CLINIC | Age: 74
End: 2024-12-03

## 2024-12-03 VITALS
OXYGEN SATURATION: 98 % | BODY MASS INDEX: 29.91 KG/M2 | DIASTOLIC BLOOD PRESSURE: 83 MMHG | WEIGHT: 168.81 LBS | HEART RATE: 103 BPM | HEIGHT: 63 IN | RESPIRATION RATE: 18 BRPM | SYSTOLIC BLOOD PRESSURE: 127 MMHG

## 2024-12-03 DIAGNOSIS — F41.9 ANXIETY AND DEPRESSION: ICD-10-CM

## 2024-12-03 DIAGNOSIS — M85.89 OSTEOPENIA OF MULTIPLE SITES: ICD-10-CM

## 2024-12-03 DIAGNOSIS — Z23 FLU VACCINE NEED: ICD-10-CM

## 2024-12-03 DIAGNOSIS — F32.A ANXIETY AND DEPRESSION: ICD-10-CM

## 2024-12-03 DIAGNOSIS — Z00.00 WELLNESS EXAMINATION: Primary | ICD-10-CM

## 2024-12-03 DIAGNOSIS — E78.2 MIXED HYPERLIPIDEMIA: ICD-10-CM

## 2024-12-03 DIAGNOSIS — N39.46 MIXED STRESS AND URGE URINARY INCONTINENCE: ICD-10-CM

## 2024-12-03 DIAGNOSIS — R82.90 ABNORMAL URINE: ICD-10-CM

## 2024-12-03 LAB — BMD RECOMMENDATION EXT: NORMAL

## 2024-12-03 PROCEDURE — 1126F AMNT PAIN NOTED NONE PRSNT: CPT | Mod: CPTII,,, | Performed by: FAMILY MEDICINE

## 2024-12-03 PROCEDURE — G0008 ADMIN INFLUENZA VIRUS VAC: HCPCS | Mod: ,,, | Performed by: FAMILY MEDICINE

## 2024-12-03 PROCEDURE — 1160F RVW MEDS BY RX/DR IN RCRD: CPT | Mod: CPTII,,, | Performed by: FAMILY MEDICINE

## 2024-12-03 PROCEDURE — 1159F MED LIST DOCD IN RCRD: CPT | Mod: CPTII,,, | Performed by: FAMILY MEDICINE

## 2024-12-03 PROCEDURE — 90653 IIV ADJUVANT VACCINE IM: CPT | Mod: ,,, | Performed by: FAMILY MEDICINE

## 2024-12-03 PROCEDURE — 1158F ADVNC CARE PLAN TLK DOCD: CPT | Mod: CPTII,,, | Performed by: FAMILY MEDICINE

## 2024-12-03 PROCEDURE — G0402 INITIAL PREVENTIVE EXAM: HCPCS | Mod: ,,, | Performed by: FAMILY MEDICINE

## 2024-12-03 PROCEDURE — 3288F FALL RISK ASSESSMENT DOCD: CPT | Mod: CPTII,,, | Performed by: FAMILY MEDICINE

## 2024-12-03 PROCEDURE — 1101F PT FALLS ASSESS-DOCD LE1/YR: CPT | Mod: CPTII,,, | Performed by: FAMILY MEDICINE

## 2024-12-03 PROCEDURE — 3079F DIAST BP 80-89 MM HG: CPT | Mod: CPTII,,, | Performed by: FAMILY MEDICINE

## 2024-12-03 PROCEDURE — 3044F HG A1C LEVEL LT 7.0%: CPT | Mod: CPTII,,, | Performed by: FAMILY MEDICINE

## 2024-12-03 PROCEDURE — 3074F SYST BP LT 130 MM HG: CPT | Mod: CPTII,,, | Performed by: FAMILY MEDICINE

## 2024-12-27 DIAGNOSIS — F32.A ANXIETY AND DEPRESSION: ICD-10-CM

## 2024-12-27 DIAGNOSIS — F41.9 ANXIETY AND DEPRESSION: ICD-10-CM

## 2024-12-27 RX ORDER — SERTRALINE HYDROCHLORIDE 50 MG/1
50 TABLET, FILM COATED ORAL
Qty: 90 TABLET | Refills: 3 | Status: SHIPPED | OUTPATIENT
Start: 2024-12-27

## 2025-01-02 ENCOUNTER — DOCUMENTATION ONLY (OUTPATIENT)
Dept: FAMILY MEDICINE | Facility: CLINIC | Age: 75
End: 2025-01-02
Payer: MEDICARE

## 2025-01-02 DIAGNOSIS — R82.90 ABNORMAL URINE: Primary | ICD-10-CM

## 2025-01-02 LAB
BACTERIA #/AREA URNS HPF: ABNORMAL /HPF
BILIRUB UR QL STRIP: NEGATIVE
CLARITY UR: CLEAR
COLOR UR: YELLOW
GLUCOSE UR QL: NEGATIVE
KETONES UR QL STRIP: NEGATIVE
LEUKOCYTE ESTERASE UR QL STRIP: NEGATIVE
NITRATES, URINE (TOX): NEGATIVE
PH UR STRIP: 6 PH UNITS
PROT UR QL STRIP: NEGATIVE
RBC # FLD AUTO: POSITIVE 10*3/UL
RBC # UR STRIP: ABNORMAL /UL
SP GR UR STRIP: <=1.005
SQUAMOUS #/AREA URNS LPF: NEGATIVE /LPF
UROBILINOGEN UA: 0.2
WBC # UR STRIP: ABNORMAL /UL

## 2025-01-02 NOTE — PROGRESS NOTES
1. Abnormal urine (Primary)  Pathophysiology/Treatment/ Dangers Discussed.  Check US. -concerns discussed referral to urologist for further recommendations.   - Ambulatory referral/consult to Urology; Future  - US Retroperitoneal Complete; Future

## 2025-01-09 ENCOUNTER — OFFICE VISIT (OUTPATIENT)
Dept: UROLOGY | Facility: CLINIC | Age: 75
End: 2025-01-09
Payer: MEDICARE

## 2025-01-09 VITALS
HEIGHT: 63 IN | WEIGHT: 166.81 LBS | SYSTOLIC BLOOD PRESSURE: 114 MMHG | RESPIRATION RATE: 20 BRPM | TEMPERATURE: 98 F | BODY MASS INDEX: 29.55 KG/M2 | OXYGEN SATURATION: 97 % | HEART RATE: 94 BPM | DIASTOLIC BLOOD PRESSURE: 75 MMHG

## 2025-01-09 DIAGNOSIS — R31.29 MICROSCOPIC HEMATURIA: ICD-10-CM

## 2025-01-09 DIAGNOSIS — N39.46 MIXED STRESS AND URGE URINARY INCONTINENCE: Primary | ICD-10-CM

## 2025-01-09 DIAGNOSIS — R82.90 ABNORMAL URINE: ICD-10-CM

## 2025-01-09 LAB
BILIRUB SERPL-MCNC: NEGATIVE MG/DL
BLOOD URINE, POC: NEGATIVE
COLOR, POC UA: YELLOW
GLUCOSE UR QL STRIP: NEGATIVE
KETONES UR QL STRIP: NEGATIVE
LEUKOCYTE ESTERASE URINE, POC: NEGATIVE
NITRITE, POC UA: NEGATIVE
PH, POC UA: 7
PROTEIN, POC: NEGATIVE
SPECIFIC GRAVITY, POC UA: 1.01
UROBILINOGEN, POC UA: 0.2

## 2025-01-09 PROCEDURE — 99215 OFFICE O/P EST HI 40 MIN: CPT | Mod: PBBFAC | Performed by: NURSE PRACTITIONER

## 2025-01-09 PROCEDURE — 81001 URINALYSIS AUTO W/SCOPE: CPT | Mod: PBBFAC | Performed by: NURSE PRACTITIONER

## 2025-01-09 NOTE — PROGRESS NOTES
Placed in room. Seen by Matt Vargas NP. Spoke with patient.  Will need CT of abdomen and pelvis. Next available cysto.

## 2025-01-09 NOTE — PROGRESS NOTES
Chief Complaint:   Chief Complaint   Patient presents with    msh-stress incontinence       HPI:   Patient is a 74-year-old female referred to Urology for microscopic hematuria and stress urinary urge incontinence.  Patient seen by PCP on 12/03/2024 with symptoms of stress urinary urge incontinence and recently had microscopic hematuria on a random urinalysis.  Today patient presents with urge incontinence only associated with extreme laughter and coughing patient feels it is not a problem.  Patient was sent here by PCP for hematuria on random UAs in the past 2 months patient's current urinalysis today revealed no hematuria present.  Patient has a history of aspirin for preventive stroke and heart issues patient has stopped in the past 2 months.      Allergies:  Review of patient's allergies indicates:  No Known Allergies    Medications:  Current Outpatient Medications   Medication Sig Dispense Refill    cholecalciferol, vitamin D3, (VITAMIN D3) 25 mcg (1,000 unit) capsule Take 1 capsule by mouth once daily 90 capsule 0    latanoprost 0.005 % ophthalmic solution Place 1 drop into both eyes every evening.      rosuvastatin (CRESTOR) 40 MG Tab Take 40 mg by mouth.      sertraline (ZOLOFT) 50 MG tablet Take 1 tablet by mouth once daily 90 tablet 3    aspirin (ECOTRIN) 81 MG EC tablet Take 81 mg by mouth.       No current facility-administered medications for this visit.       Review of Systems:  General: No fever, chills, fatigability, or weight loss.  Skin: No rashes, itching, or changes in color or texture of skin.  Chest: Denies SANTOS, cyanosis, wheezing, cough, and sputum production.  Abdomen: Appetite fine. No weight loss. Denies diarrhea, abdominal pain, hematemesis, or blood in stool.  Musculoskeletal: No joint stiffness or swelling. Denies back pain.  : As above.  All other review of systems negative.    PMH:  Past Medical History:   Diagnosis Date    Anxiety and depression 04/29/2023    Mixed hyperlipidemia  04/29/2023    Mixed stress and urge urinary incontinence 04/29/2023       PSH:  Past Surgical History:   Procedure Laterality Date    COLONOSCOPY  12/15/2014    EXTRACTION OF CATARACT  2022 9/2022- L eye 10/2022 R eye       FamHx:  Family History   Problem Relation Name Age of Onset    Pneumonia Mother  88    Heart attack Father  86    Heart attack Brother  55       SocHx:  Social History     Socioeconomic History    Marital status: Single    Number of children: 2   Occupational History    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[employer]   Tobacco Use    Smoking status: Former     Types: Cigarettes     Passive exposure: Never    Smokeless tobacco: Never   Substance and Sexual Activity    Alcohol use: Not Currently    Drug use: Never    Sexual activity: Not Currently     Partners: Male     Social Drivers of Health     Financial Resource Strain: Low Risk  (5/29/2024)    Overall Financial Resource Strain (CARDIA)     Difficulty of Paying Living Expenses: Not hard at all   Food Insecurity: No Food Insecurity (5/29/2024)    Hunger Vital Sign     Worried About Running Out of Food in the Last Year: Never true     Ran Out of Food in the Last Year: Never true   Transportation Needs: No Transportation Needs (5/29/2024)    TRANSPORTATION NEEDS     Transportation : No   Physical Activity: Sufficiently Active (5/29/2024)    Exercise Vital Sign     Days of Exercise per Week: 6 days     Minutes of Exercise per Session: 50 min   Stress: No Stress Concern Present (5/29/2024)    [employer] [place] [employer] - Occupational Stress Questionnaire     Feeling of Stress : Not at all   Housing Stability: Low Risk  (5/29/2024)    Housing Stability Vital Sign     Unable to Pay for Housing in the Last Year: No     Homeless in the Last Year: No       Physical Exam:  Vitals:    01/09/25 0819   BP: 114/75   Pulse: 94   Resp: 20   Temp: 98.4 °F (36.9 °C)     General: A&Ox3, no apparent distress, no deformities  Neck: No  masses, normal thyroid  Lungs: CTA adolfo, no use of accessory muscles  Heart: RRR, no arrhythmias  Abdomen: Soft, NT, ND, no masses, no hernias, no hepatosplenomegaly  Lymphatic: Neck and groin nodes negative  Skin: The skin is warm and dry. No jaundice.  Ext: No c/c/e.      Urinalysis:  Results for orders placed or performed in visit on 01/09/25   POCT URINE DIPSTICK WITH MICROSCOPE, AUTOMATED   Result Value Ref Range    Color, UA Yellow     Spec Grav UA 1.015     pH, UA 7.0     WBC, UA Negative     Nitrite, UA Negative     Protein, POC Negative     Glucose, UA Negative     Ketones, UA Negative     Urobilinogen, UA 0.2     Bilirubin, POC Negative     Blood, UA Negative    Microscopic urinalysis revealed negative RBCs, negative nitrites, negative WBCs.        Impression:  1. Abnormal urine  - Ambulatory referral/consult to Urology  - POCT URINE DIPSTICK WITH MICROSCOPE, AUTOMATED    2. Mixed stress and urge urinary incontinence  - POCT URINE DIPSTICK WITH MICROSCOPE, AUTOMATED      Plan:  Instructed patient to scheduled for a CT of the abdomen pelvis with oral contrast to evaluate urologic system and schedule patient for a cystoscope with Dr. Snider to rule out microscopic hematuria.    RTC after cystoscope with Agatha per her discretion.  Instructed patient if develops any abnormal urologic symptoms notify clinic to be re-evaluate treated or during after hours go to emergency room versus urgent here.                           F

## 2025-02-12 ENCOUNTER — HOSPITAL ENCOUNTER (OUTPATIENT)
Dept: RADIOLOGY | Facility: HOSPITAL | Age: 75
Discharge: HOME | End: 2025-02-12
Attending: NURSE PRACTITIONER
Payer: MEDICARE

## 2025-02-12 DIAGNOSIS — R31.29 MICROSCOPIC HEMATURIA: ICD-10-CM

## 2025-02-12 LAB — CREAT SERPL-MCNC: <0.2 MG/DL (ref 0.55–1.02)

## 2025-02-12 PROCEDURE — 82565 ASSAY OF CREATININE: CPT | Performed by: NURSE PRACTITIONER

## 2025-02-12 PROCEDURE — 25500020 PHARM REV CODE 255

## 2025-02-12 PROCEDURE — 74178 CT ABD&PLV WO CNTR FLWD CNTR: CPT | Mod: TC

## 2025-02-12 RX ADMIN — IOHEXOL 100 ML: 350 INJECTION, SOLUTION INTRAVENOUS at 11:02

## 2025-02-19 ENCOUNTER — PROCEDURE VISIT (OUTPATIENT)
Dept: UROLOGY | Facility: CLINIC | Age: 75
End: 2025-02-19
Payer: MEDICARE

## 2025-02-19 VITALS
HEIGHT: 63 IN | BODY MASS INDEX: 29.77 KG/M2 | HEART RATE: 98 BPM | DIASTOLIC BLOOD PRESSURE: 87 MMHG | RESPIRATION RATE: 18 BRPM | SYSTOLIC BLOOD PRESSURE: 123 MMHG | OXYGEN SATURATION: 98 % | WEIGHT: 168 LBS

## 2025-02-19 DIAGNOSIS — N28.9 RENAL LESION: ICD-10-CM

## 2025-02-19 DIAGNOSIS — N28.1 RENAL CYST: ICD-10-CM

## 2025-02-19 DIAGNOSIS — R31.21 ASYMPTOMATIC MICROSCOPIC HEMATURIA: Primary | ICD-10-CM

## 2025-02-19 DIAGNOSIS — N20.0 RENAL CALCULUS: ICD-10-CM

## 2025-02-19 LAB
BILIRUB SERPL-MCNC: NEGATIVE MG/DL
BLOOD URINE, POC: NORMAL
COLOR, POC UA: YELLOW
GLUCOSE UR QL STRIP: NEGATIVE
KETONES UR QL STRIP: NEGATIVE
LEUKOCYTE ESTERASE URINE, POC: NORMAL
NITRITE, POC UA: NEGATIVE
PH, POC UA: 5.5
PROTEIN, POC: NEGATIVE
SPECIFIC GRAVITY, POC UA: 1.02
UROBILINOGEN, POC UA: 0.2

## 2025-02-19 PROCEDURE — 99213 OFFICE O/P EST LOW 20 MIN: CPT | Mod: 25,S$PBB,, | Performed by: UROLOGY

## 2025-02-19 PROCEDURE — 52000 CYSTOURETHROSCOPY: CPT | Mod: PBBFAC | Performed by: UROLOGY

## 2025-02-19 PROCEDURE — 52000 CYSTOURETHROSCOPY: CPT | Mod: S$PBB,,, | Performed by: UROLOGY

## 2025-02-19 PROCEDURE — 81001 URINALYSIS AUTO W/SCOPE: CPT | Mod: PBBFAC | Performed by: UROLOGY

## 2025-02-19 RX ORDER — CIPROFLOXACIN 500 MG/1
500 TABLET ORAL
Status: COMPLETED | OUTPATIENT
Start: 2025-02-19 | End: 2025-02-19

## 2025-02-19 RX ORDER — LIDOCAINE HYDROCHLORIDE 20 MG/ML
JELLY TOPICAL
Status: COMPLETED | OUTPATIENT
Start: 2025-02-19 | End: 2025-02-19

## 2025-02-19 RX ADMIN — LIDOCAINE HYDROCHLORIDE: 20 JELLY TOPICAL at 11:02

## 2025-02-19 RX ADMIN — CIPROFLOXACIN 500 MG: 500 TABLET ORAL at 11:02

## 2025-02-19 NOTE — PROGRESS NOTES
Seen by Dr. Argueta.  Procedure consent signed and witnessed.  Time out performed.  Cipro 500mg po given.  Lidocaine urojet applied.  Assisted with procedure.  Tolerated well.  RTC 6 months with CT.

## 2025-02-25 NOTE — PROCEDURES
CC:  Cystoscopy    HPI:  Katerina Alfaro is a 74 y.o. female here for cystoscopy for microscopic hematuria.  Has a history of microscopic hematuria.  She is here today for cystoscopy.  There were several findings on the CT scan which require follow-up.  See CT results below.    Urinalysis:  Results for orders placed or performed in visit on 02/19/25   POCT URINE DIPSTICK WITH MICROSCOPE, AUTOMATED   Result Value Ref Range    Color, UA Yellow     Spec Grav UA 1.025     pH, UA 5.5     WBC, UA Trace     Nitrite, UA Negative     Protein, POC Negative     Glucose, UA Negative     Ketones, UA Negative     Urobilinogen, UA 0.2     Bilirubin, POC Negative     Blood, UA Trace-intact        Microscopic Urinalysis:  WBC:   None per HPF     RBC:    1-3 per HPF     Bacteria:    None per HPF     Squamous epithelial cells:  None per HPF  Crystals:   None    Lab Results:    Recent Labs     02/12/25  1022   CREATININE <0.20*        Imaging:  CT - 12 February 2025:  There are a few punctate nonobstructing medullary calcifications bilaterally  Bilateral simple renal cysts  There is a small hyperdense lesion in the left kidney which is too small to characterize    ROS:  All systems reviewed and are negative except as documented in HPI and/or Assessment and Plan.     Patient Active Problem List:     Problem List[1]     Past Medical History:  Past Medical History:   Diagnosis Date    Anxiety and depression 04/29/2023    Mixed hyperlipidemia 04/29/2023    Mixed stress and urge urinary incontinence 04/29/2023        Past Surgical History:  Past Surgical History:   Procedure Laterality Date    COLONOSCOPY  12/15/2014    EXTRACTION OF CATARACT  2022 9/2022- L eye 10/2022 R eye        Family History:  Family History   Problem Relation Name Age of Onset    Pneumonia Mother  88    Heart attack Father  86    Heart attack Brother  55        Social History:  Social History     Socioeconomic History    Marital status: Single    Number of  children: 2   Occupational History    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[employer]   Tobacco Use    Smoking status: Former     Types: Cigarettes     Passive exposure: Never    Smokeless tobacco: Never   Substance and Sexual Activity    Alcohol use: Not Currently    Drug use: Never    Sexual activity: Not Currently     Partners: Male     Social Drivers of Health     Financial Resource Strain: Low Risk  (5/29/2024)    Overall Financial Resource Strain (CARDIA)     Difficulty of Paying Living Expenses: Not hard at all   Food Insecurity: No Food Insecurity (5/29/2024)    Hunger Vital Sign     Worried About Running Out of Food in the Last Year: Never true     Ran Out of Food in the Last Year: Never true   Transportation Needs: No Transportation Needs (5/29/2024)    TRANSPORTATION NEEDS     Transportation : No   Physical Activity: Sufficiently Active (5/29/2024)    Exercise Vital Sign     Days of Exercise per Week: 6 days     Minutes of Exercise per Session: 50 min   Stress: No Stress Concern Present (5/29/2024)    [employer] - Occupational Stress Questionnaire     Feeling of Stress : Not at all   Housing Stability: Unknown (5/29/2024)    Housing Stability Vital Sign     Unable to Pay for Housing in the Last Year: No     Homeless in the Last Year: No        Allergies:  Review of patient's allergies indicates:  No Known Allergies     Objective:  Vitals:    02/19/25 1114   BP: 123/87   Pulse: 98   Resp: 18     General:  Well developed, well nourished adult female in no acute distress  Abdomen: Soft, nontender, no masses  Extremities:  No clubbing, cyanosis, or edema  Neurologic:  Grossly intact  Musculoskeletal:  Normal tone  :  External genitalia is normal without lesions.  Vagina is normal.      Cystoscopy:         - Urethral meatus:  No strictures        - Urethra:  Normal without strictures or lesions        - Bladder neck:  Normal        - Bladder:  No mucosal  abnormalities        - Ureteral orifices:  On the trigone with clear efflux bilaterally    The patient tolerated the procedure well without complications.  She was given Cipro 500mg, one tablet in the clinic.   The urethra was anesthetized with 2% Lidocaine Jelly, Urojet.      Assessment:  1. Asymptomatic microscopic hematuria  - POCT URINE DIPSTICK WITH MICROSCOPE, AUTOMATED  - CT Abdomen Pelvis W Wo Contrast; Future  - Creatinine, serum; Future    2. Renal calculus  - CT Abdomen Pelvis W Wo Contrast; Future  - Creatinine, serum; Future    3. Renal cyst  - CT Abdomen Pelvis W Wo Contrast; Future  - Creatinine, serum; Future    4. Renal lesion  - CT Abdomen Pelvis W Wo Contrast; Future  - Creatinine, serum; Future     Plan:  Cystoscopy was negative for etiology of microscopic hematuria.  We will follow the renal calculus with a CT scan in six months.  The cysts are simple cysts and really do not require follow-up however they will be seen on the CT scan ordered for the other reasons.  Follow renal lesion that is currently indeterminate with a CT scan in six months.    Follow-up tests needed:   CT scan in six months.      Return appointment:  Six months with above study.               [1]   Patient Active Problem List  Diagnosis    Mixed hyperlipidemia    Anxiety and depression    Mixed stress and urge urinary incontinence    Osteopenia of multiple sites    Microscopic hematuria

## 2025-05-23 DIAGNOSIS — F41.9 ANXIETY AND DEPRESSION: ICD-10-CM

## 2025-05-23 DIAGNOSIS — F32.A ANXIETY AND DEPRESSION: ICD-10-CM

## 2025-05-23 RX ORDER — SERTRALINE HYDROCHLORIDE 50 MG/1
50 TABLET, FILM COATED ORAL DAILY
Qty: 90 TABLET | Refills: 3 | Status: SHIPPED | OUTPATIENT
Start: 2025-05-23

## 2025-05-29 ENCOUNTER — RESULTS FOLLOW-UP (OUTPATIENT)
Dept: FAMILY MEDICINE | Facility: CLINIC | Age: 75
End: 2025-05-29

## 2025-05-29 ENCOUNTER — DOCUMENTATION ONLY (OUTPATIENT)
Dept: FAMILY MEDICINE | Facility: CLINIC | Age: 75
End: 2025-05-29
Payer: MEDICARE

## 2025-05-29 LAB
ALBUMIN: 4.2
ALP ISOS SERPL LEV INH-CCNC: 72 U/L
ANION GAP SERPL CALC-SCNC: 9 MMOL/L (ref ?–30)
AST: 27
CALCIUM SERPL-MCNC: 9.5 MG/DL
CHLORIDE SERPL-SCNC: 108 MMOL/L (ref 99–108)
CHOLEST SERPL-MSCNC: 177 MG/DL (ref 0–200)
CO2 SERPL-SCNC: 25 MMOL/L
CREAT SERPL-MCNC: 0.7 MG/DL
EGFR: 78
GLUCOSE SERPL-MCNC: 112 MG/DL
HDLC SERPL-MCNC: 59 MG/DL (ref 35–70)
HEMATOCRIT: 44.6
HEMOGLOBIN: 15.2
LDLC SERPL CALC-MCNC: 86 MG/DL (ref 0–160)
MCH RBC QN AUTO: 31.1 PG
MCHC RBC AUTO-ENTMCNC: 34.1 G/DL
MCV RBC AUTO: 91.4 FL
PLATELET # BLD AUTO: 225 X10(3)/MCL (ref 130–400)
POTASSIUM SERPL-SCNC: 4.2 MMOL/L (ref 3.4–5.3)
PROTEIN TOTAL: 7
RBC # BLD AUTO: 4.88 10*6/UL
SODIUM BLD-SCNC: 142 MMOL/L (ref 137–147)
TRIGL SERPL-MCNC: 159 MG/DL (ref 40–160)
WBC # BLD: 5.41 10*3/UL

## 2025-06-05 NOTE — PROGRESS NOTES
Patient ID: [id number]     Chief Complaint: Hyperlipidemia      HPI:     Katerina Alfaro is a 74 y.o. female here today for  follow up:   Patient's sister was recently diagnosed with breast cancer.  Stressors and coping mechanisms discussed.  Would like medication prescribed for anxiety related to [place]-in the process of going on a vacation-[place] through [place]-concerns discussed.  1) Hyperlipidemia: Patient is taking Crestor 40 mg-does have an appointment with her cardiologist in the next few weeks. No side effects of medication noted.  2) Depression/Anxiety: Patient has been doing well on Zoloft 50 mg . Trying to incorporate lifestyle changes including exercise.  No noticeable side effects of the medication.        Past Medical History:   Diagnosis Date    Anxiety and depression 04/29/2023    Mixed hyperlipidemia 04/29/2023    Mixed stress and urge urinary incontinence 04/29/2023        Past Surgical History:   Procedure Laterality Date    COLONOSCOPY  12/15/2014    EXTRACTION OF CATARACT  2022 9/2022- L eye 10/2022 R eye        Social History[1]     Social History[2]     Current Outpatient Medications   Medication Instructions    aspirin (ECOTRIN) 81 mg    cholecalciferol, vitamin D3, (VITAMIN D3) 25 mcg (1,000 unit) capsule Take 1 capsule by mouth once daily    docosahexaenoic acid/epa (FISH OIL ORAL) 1 capsule, Daily    latanoprost 0.005 % ophthalmic solution 1 drop, Nightly    LORazepam (ATIVAN) 0.5 mg, Oral, Nightly    rosuvastatin (CRESTOR) 40 mg    sertraline (ZOLOFT) 50 mg, Oral, Daily       Review of patient's allergies indicates:  No Known Allergies     Patient Care Team:  Alina Negron MD as PCP - General (Family Medicine)  Javier Ch Jr., MD as Consulting Physician (Cardiology)  Shannon Castrejon MD as Consulting Physician (Ophthalmology)  Ashok Milan MD (Dermatology)       Subjective:     Review of Systems    12 point review of systems conducted, negative except as stated in  "the history of present illness. See HPI for details.      Objective:     Visit Vitals  /76 (BP Location: Left arm, Patient Position: Sitting)   Pulse 82   Resp 16   Ht 5' 2" (1.575 m)   Wt 76 kg (167 lb 9.6 oz)   SpO2 98%   BMI 30.65 kg/m²       Physical Exam    General: Alert and oriented, No acute distress.  Head: Normocephalic, Atraumatic.  Eye: Pupils are equal, round and reactive to light, Extraocular movements are intact, Sclera non-icteric.  Ears/Nose/Throat: Normal, Mucosa moist,Clear.  Neck/Thyroid: Supple, Non-tender  Respiratory: Clear to auscultation bilaterally  Cardiovascular: S1/S2 normal  Gastrointestinal: Soft, Non-tender, Non-distended, Normal bowel sounds, No palpable organomegaly.  Musculoskeletal: Normal range of motion.  Integumentary: Warm, Dry  Extremities: No clubbing, cyanosis or edema  Neurologic: No focal deficits, Cranial Nerves II-XII are grossly intact  Psychiatric: Normal interaction, Coherent speech       Assessment:       ICD-10-CM ICD-9-CM   1. Mixed hyperlipidemia  E78.2 272.2   2. Anxiety and depression  F41.9 300.00    F32.A 311   3. Situational anxiety  F41.8 300.09   4. Mixed stress and urge urinary incontinence  N39.46 788.33   5. Blood glucose abnormal  R73.09 790.29   6. Breast cancer screening by mammogram  Z12.31 V76.12        Plan:     1. Mixed hyperlipidemia (Primary)  Lab Results   Component Value Date    CHOL 177 05/29/2025    TRIG 159 05/29/2025    HDL 59 05/29/2025     Pathophysiology/treatment/dangers discussed. Patient to continue diet modification-Crestor 40 mg.   Total cholesterol 177 ( Goal less than 200) HDL 59 ( Goal high as possible) LDL 86 ( Goal less than 100) Triglycerides 159 ( Goal less than 150)   - CBC Auto Differential; Future  - Comprehensive Metabolic Panel; Future  - Lipid Panel; Future  - TSH; Future    2. Anxiety and depression  Patient is doing well on Zoloft 50 mg - continue to encourage lifestyle modifications including 20-30 minutes " exercise daily/ meditation/ empower self through compassion. Always encourage patient to use lowest dose of medicine possible.      3. Situational anxiety  Patient is being prescribed  benzodiazepine in order to address issues related to acute anxiety-risk of abuse and addiction discussed with patient.  Encouraged patient to limit use at lowest dose possible/least frequency.  Continue to encourage lifestyle modifications including regular exercise.   - LORazepam (ATIVAN) 0.5 MG tablet; Take 1 tablet (0.5 mg total) by mouth every evening.  Dispense: 30 tablet; Refill: 0    4. Mixed stress and urge urinary incontinence  Is Co managed with a urologist-continue per recommendations of urologist.     5. Blood glucose abnormal  Hemoglobin A1c  .  Lab Results   Component Value Date    HGBA1C 5.7 12/02/2024     Normal less than 5.7 - Diagnosis of Type 2 Diabetes Mellitus at 6.5. Encourage diet and activity modification- Pathophysiology/treatment/dangers discussed.    - Hemoglobin A1C; Future  - Urinalysis, Reflex to Urine Culture Urine, Clean Catch; Future    6. Breast cancer screening by mammogram  Check studies management based upon results.  Pathophysiology/treatment/dangers discussed.    - Mammo Digital Screening Bilat; Future    Follow up in about 6 months (around 12/12/2025) for Medicare Wellness. In addition to their scheduled follow up, the patient has also been instructed to follow up on as needed basis.     Future Appointments   Date Time Provider Department Center   6/17/2025 10:30 AM BS MAMMO1 [facility] [facility]   8/22/2025  7:30 AM Jen Argueta DO [facility] [place]    12/17/2025 10:00 AM Alina Negron MD [facility] [place]         [1]   Social History  Tobacco Use    Smoking status: Former     Types: Cigarettes     Passive exposure: Never    Smokeless tobacco: Never   Substance Use Topics    Alcohol use: Not Currently    Drug use: Never   [2]   Social  History  Socioeconomic History    Marital status: Single    Number of children: 2

## 2025-06-12 ENCOUNTER — OFFICE VISIT (OUTPATIENT)
Dept: FAMILY MEDICINE | Facility: CLINIC | Age: 75
End: 2025-06-12
Payer: MEDICARE

## 2025-06-12 VITALS
BODY MASS INDEX: 30.85 KG/M2 | HEIGHT: 62 IN | WEIGHT: 167.63 LBS | HEART RATE: 82 BPM | SYSTOLIC BLOOD PRESSURE: 114 MMHG | OXYGEN SATURATION: 98 % | DIASTOLIC BLOOD PRESSURE: 76 MMHG | RESPIRATION RATE: 16 BRPM

## 2025-06-12 DIAGNOSIS — R73.09 BLOOD GLUCOSE ABNORMAL: ICD-10-CM

## 2025-06-12 DIAGNOSIS — F32.A ANXIETY AND DEPRESSION: ICD-10-CM

## 2025-06-12 DIAGNOSIS — E78.2 MIXED HYPERLIPIDEMIA: Primary | ICD-10-CM

## 2025-06-12 DIAGNOSIS — Z12.31 BREAST CANCER SCREENING BY MAMMOGRAM: ICD-10-CM

## 2025-06-12 DIAGNOSIS — N39.46 MIXED STRESS AND URGE URINARY INCONTINENCE: ICD-10-CM

## 2025-06-12 DIAGNOSIS — F41.8 SITUATIONAL ANXIETY: ICD-10-CM

## 2025-06-12 DIAGNOSIS — F41.9 ANXIETY AND DEPRESSION: ICD-10-CM

## 2025-06-12 PROCEDURE — 3008F BODY MASS INDEX DOCD: CPT | Mod: CPTII,,, | Performed by: FAMILY MEDICINE

## 2025-06-12 PROCEDURE — 3078F DIAST BP <80 MM HG: CPT | Mod: CPTII,,, | Performed by: FAMILY MEDICINE

## 2025-06-12 PROCEDURE — 1101F PT FALLS ASSESS-DOCD LE1/YR: CPT | Mod: CPTII,,, | Performed by: FAMILY MEDICINE

## 2025-06-12 PROCEDURE — 1159F MED LIST DOCD IN RCRD: CPT | Mod: CPTII,,, | Performed by: FAMILY MEDICINE

## 2025-06-12 PROCEDURE — 1126F AMNT PAIN NOTED NONE PRSNT: CPT | Mod: CPTII,,, | Performed by: FAMILY MEDICINE

## 2025-06-12 PROCEDURE — 3074F SYST BP LT 130 MM HG: CPT | Mod: CPTII,,, | Performed by: FAMILY MEDICINE

## 2025-06-12 PROCEDURE — 1160F RVW MEDS BY RX/DR IN RCRD: CPT | Mod: CPTII,,, | Performed by: FAMILY MEDICINE

## 2025-06-12 PROCEDURE — 3288F FALL RISK ASSESSMENT DOCD: CPT | Mod: CPTII,,, | Performed by: FAMILY MEDICINE

## 2025-06-12 PROCEDURE — 99214 OFFICE O/P EST MOD 30 MIN: CPT | Mod: ,,, | Performed by: FAMILY MEDICINE

## 2025-06-12 PROCEDURE — G2211 COMPLEX E/M VISIT ADD ON: HCPCS | Mod: ,,, | Performed by: FAMILY MEDICINE

## 2025-06-12 RX ORDER — LORAZEPAM 0.5 MG/1
0.5 TABLET ORAL NIGHTLY
Qty: 30 TABLET | Refills: 0 | Status: SHIPPED | OUTPATIENT
Start: 2025-06-12 | End: 2025-07-12

## 2025-06-22 ENCOUNTER — RESULTS FOLLOW-UP (OUTPATIENT)
Dept: FAMILY MEDICINE | Facility: CLINIC | Age: 75
End: 2025-06-22

## 2025-07-28 ENCOUNTER — HOSPITAL ENCOUNTER (OUTPATIENT)
Dept: RADIOLOGY | Facility: HOSPITAL | Age: 75
Discharge: HOME | End: 2025-07-28
Attending: UROLOGY
Payer: MEDICARE

## 2025-07-28 DIAGNOSIS — R31.21 ASYMPTOMATIC MICROSCOPIC HEMATURIA: ICD-10-CM

## 2025-07-28 DIAGNOSIS — N28.1 RENAL CYST: ICD-10-CM

## 2025-07-28 DIAGNOSIS — N28.9 RENAL LESION: ICD-10-CM

## 2025-07-28 DIAGNOSIS — N20.0 RENAL CALCULUS: ICD-10-CM

## 2025-07-28 LAB
CREAT SERPL-MCNC: 0.82 MG/DL (ref 0.55–1.02)
GFR SERPLBLD CREATININE-BSD FMLA CKD-EPI: >60 ML/MIN/1.73/M2

## 2025-07-28 PROCEDURE — 74178 CT ABD&PLV WO CNTR FLWD CNTR: CPT | Mod: TC

## 2025-07-28 PROCEDURE — 82565 ASSAY OF CREATININE: CPT | Performed by: UROLOGY

## 2025-07-28 PROCEDURE — 25500020 PHARM REV CODE 255: Performed by: UROLOGY

## 2025-07-28 RX ADMIN — IOHEXOL 100 ML: 350 INJECTION, SOLUTION INTRAVENOUS at 03:07

## 2025-08-15 ENCOUNTER — OFFICE VISIT (OUTPATIENT)
Dept: UROLOGY | Facility: CLINIC | Age: 75
End: 2025-08-15
Payer: MEDICARE

## 2025-08-15 VITALS
BODY MASS INDEX: 30.69 KG/M2 | HEIGHT: 62 IN | HEART RATE: 80 BPM | DIASTOLIC BLOOD PRESSURE: 85 MMHG | OXYGEN SATURATION: 96 % | WEIGHT: 166.81 LBS | TEMPERATURE: 98 F | SYSTOLIC BLOOD PRESSURE: 124 MMHG

## 2025-08-15 DIAGNOSIS — N28.1 RENAL CYST: ICD-10-CM

## 2025-08-15 DIAGNOSIS — N20.0 RENAL CALCULUS: ICD-10-CM

## 2025-08-15 DIAGNOSIS — R31.21 ASYMPTOMATIC MICROSCOPIC HEMATURIA: Primary | ICD-10-CM

## 2025-08-15 DIAGNOSIS — N28.9 RENAL LESION: ICD-10-CM

## 2025-08-15 LAB
BILIRUB SERPL-MCNC: NORMAL MG/DL
BLOOD URINE, POC: NORMAL
COLOR, POC UA: YELLOW
GLUCOSE UR QL STRIP: NORMAL
KETONES UR QL STRIP: NORMAL
LEUKOCYTE ESTERASE URINE, POC: NORMAL
NITRITE, POC UA: NORMAL
PH, POC UA: 7
PROTEIN, POC: NORMAL
SPECIFIC GRAVITY, POC UA: 1.01
UROBILINOGEN, POC UA: 0.2

## 2025-08-15 PROCEDURE — 99215 OFFICE O/P EST HI 40 MIN: CPT | Mod: PBBFAC | Performed by: UROLOGY
